# Patient Record
Sex: MALE | Race: WHITE | Employment: OTHER | URBAN - METROPOLITAN AREA
[De-identification: names, ages, dates, MRNs, and addresses within clinical notes are randomized per-mention and may not be internally consistent; named-entity substitution may affect disease eponyms.]

---

## 2017-05-25 ENCOUNTER — COMMUNICATION - HEALTHEAST (OUTPATIENT)
Dept: CARDIOLOGY | Facility: CLINIC | Age: 57
End: 2017-05-25

## 2017-10-03 ENCOUNTER — OFFICE VISIT - HEALTHEAST (OUTPATIENT)
Dept: CARDIOLOGY | Facility: CLINIC | Age: 57
End: 2017-10-03

## 2017-10-03 ENCOUNTER — COMMUNICATION - HEALTHEAST (OUTPATIENT)
Dept: TELEHEALTH | Facility: CLINIC | Age: 57
End: 2017-10-03

## 2017-10-03 DIAGNOSIS — I25.10 CAD (CORONARY ARTERY DISEASE): ICD-10-CM

## 2017-10-03 DIAGNOSIS — R42 LIGHTHEADED: ICD-10-CM

## 2017-10-03 DIAGNOSIS — E78.5 HYPERLIPIDEMIA LDL GOAL <70: ICD-10-CM

## 2017-10-03 ASSESSMENT — MIFFLIN-ST. JEOR: SCORE: 1675.35

## 2017-10-11 ENCOUNTER — HOSPITAL ENCOUNTER (OUTPATIENT)
Dept: CARDIOLOGY | Facility: CLINIC | Age: 57
Discharge: HOME | End: 2017-10-11
Attending: INTERNAL MEDICINE

## 2017-10-11 DIAGNOSIS — I25.10 CAD (CORONARY ARTERY DISEASE): ICD-10-CM

## 2017-10-11 LAB
CV STRESS CURRENT BP HE: NORMAL
CV STRESS CURRENT HR HE: 102
CV STRESS CURRENT HR HE: 109
CV STRESS CURRENT HR HE: 110
CV STRESS CURRENT HR HE: 111
CV STRESS CURRENT HR HE: 113
CV STRESS CURRENT HR HE: 113
CV STRESS CURRENT HR HE: 114
CV STRESS CURRENT HR HE: 130
CV STRESS CURRENT HR HE: 132
CV STRESS CURRENT HR HE: 132
CV STRESS CURRENT HR HE: 134
CV STRESS CURRENT HR HE: 140
CV STRESS CURRENT HR HE: 140
CV STRESS CURRENT HR HE: 145
CV STRESS CURRENT HR HE: 150
CV STRESS CURRENT HR HE: 150
CV STRESS CURRENT HR HE: 72
CV STRESS CURRENT HR HE: 78
CV STRESS CURRENT HR HE: 81
CV STRESS CURRENT HR HE: 84
CV STRESS CURRENT HR HE: 84
CV STRESS CURRENT HR HE: 85
CV STRESS CURRENT HR HE: 86
CV STRESS CURRENT HR HE: 86
CV STRESS CURRENT HR HE: 88
CV STRESS CURRENT HR HE: 88
CV STRESS CURRENT HR HE: 89
CV STRESS CURRENT HR HE: 89
CV STRESS CURRENT HR HE: 91
CV STRESS CURRENT HR HE: 96
CV STRESS CURRENT HR HE: 97
CV STRESS CURRENT HR HE: 97
CV STRESS CURRENT HR HE: 99
CV STRESS DEVIATION TIME HE: NORMAL
CV STRESS ECHO PERCENT HR HE: NORMAL
CV STRESS EXERCISE STAGE HE: NORMAL
CV STRESS FINAL RESTING BP HE: NORMAL
CV STRESS FINAL RESTING HR HE: 86
CV STRESS MAX HR HE: 150
CV STRESS MAX TREADMILL GRADE HE: 18
CV STRESS MAX TREADMILL SPEED HE: 5
CV STRESS PEAK DIA BP HE: NORMAL
CV STRESS PEAK SYS BP HE: NORMAL
CV STRESS PHASE HE: NORMAL
CV STRESS PROTOCOL HE: NORMAL
CV STRESS RESTING PT POSITION HE: NORMAL
CV STRESS RESTING PT POSITION HE: NORMAL
CV STRESS ST DEVIATION AMOUNT HE: NORMAL
CV STRESS ST DEVIATION ELEVATION HE: NORMAL
CV STRESS ST EVELATION AMOUNT HE: NORMAL
CV STRESS TEST TYPE HE: NORMAL
CV STRESS TOTAL STAGE TIME MIN 1 HE: NORMAL
ECHO EJECTION FRACTION ESTIMATED: 60 %
STRESS ECHO BASELINE BP: NORMAL
STRESS ECHO BASELINE HR: 82
STRESS ECHO CALCULATED PERCENT HR: 93 %
STRESS ECHO LAST STRESS BP: NORMAL
STRESS ECHO LAST STRESS HR: 150
STRESS ECHO POST ESTIMATED WORKLOAD: 14.7
STRESS ECHO POST EXERCISE DUR MIN: 14
STRESS ECHO POST EXERCISE DUR SEC: 0
STRESS ECHO TARGET HR: 138

## 2018-06-08 ENCOUNTER — COMMUNICATION - HEALTHEAST (OUTPATIENT)
Dept: CARDIOLOGY | Facility: CLINIC | Age: 58
End: 2018-06-08

## 2018-06-08 DIAGNOSIS — E78.5 HYPERLIPIDEMIA: ICD-10-CM

## 2018-12-01 ENCOUNTER — COMMUNICATION - HEALTHEAST (OUTPATIENT)
Dept: CARDIOLOGY | Facility: CLINIC | Age: 58
End: 2018-12-01

## 2018-12-01 DIAGNOSIS — E78.5 HYPERLIPIDEMIA: ICD-10-CM

## 2018-12-07 ENCOUNTER — COMMUNICATION - HEALTHEAST (OUTPATIENT)
Dept: CARDIOLOGY | Facility: CLINIC | Age: 58
End: 2018-12-07

## 2018-12-07 DIAGNOSIS — E78.5 HYPERLIPIDEMIA: ICD-10-CM

## 2019-01-15 ENCOUNTER — COMMUNICATION - HEALTHEAST (OUTPATIENT)
Dept: TELEHEALTH | Facility: CLINIC | Age: 59
End: 2019-01-15

## 2019-01-16 ENCOUNTER — OFFICE VISIT - HEALTHEAST (OUTPATIENT)
Dept: CARDIOLOGY | Facility: TELEHEALTH | Age: 59
End: 2019-01-16

## 2019-01-16 DIAGNOSIS — E78.5 HYPERLIPIDEMIA WITH TARGET LDL LESS THAN 70: ICD-10-CM

## 2019-01-16 DIAGNOSIS — I25.10 CAD (CORONARY ARTERY DISEASE): ICD-10-CM

## 2019-01-16 ASSESSMENT — MIFFLIN-ST. JEOR: SCORE: 1702.57

## 2019-01-24 ENCOUNTER — COMMUNICATION - HEALTHEAST (OUTPATIENT)
Dept: CARDIOLOGY | Facility: TELEHEALTH | Age: 59
End: 2019-01-24

## 2019-01-24 DIAGNOSIS — E78.5 HYPERLIPIDEMIA: ICD-10-CM

## 2019-01-31 ENCOUNTER — AMBULATORY - HEALTHEAST (OUTPATIENT)
Dept: LAB | Facility: HOSPITAL | Age: 59
End: 2019-01-31

## 2019-01-31 ENCOUNTER — COMMUNICATION - HEALTHEAST (OUTPATIENT)
Dept: CARDIOLOGY | Facility: CLINIC | Age: 59
End: 2019-01-31

## 2019-01-31 DIAGNOSIS — I25.10 CAD (CORONARY ARTERY DISEASE): ICD-10-CM

## 2019-01-31 LAB
25(OH)D3 SERPL-MCNC: 62.9 NG/ML (ref 30–80)
ALBUMIN SERPL-MCNC: 4.3 G/DL (ref 3.5–5)
ALP SERPL-CCNC: 54 U/L (ref 45–120)
ALT SERPL W P-5'-P-CCNC: 25 U/L (ref 0–45)
ANION GAP SERPL CALCULATED.3IONS-SCNC: 7 MMOL/L (ref 5–18)
AST SERPL W P-5'-P-CCNC: 23 U/L (ref 0–40)
BILIRUB SERPL-MCNC: 0.9 MG/DL (ref 0–1)
BUN SERPL-MCNC: 15 MG/DL (ref 8–22)
CALCIUM SERPL-MCNC: 9.6 MG/DL (ref 8.5–10.5)
CHLORIDE BLD-SCNC: 103 MMOL/L (ref 98–107)
CHOLEST SERPL-MCNC: 124 MG/DL
CO2 SERPL-SCNC: 29 MMOL/L (ref 22–31)
CREAT SERPL-MCNC: 1.13 MG/DL (ref 0.7–1.3)
FASTING STATUS PATIENT QL REPORTED: YES
GFR SERPL CREATININE-BSD FRML MDRD: >60 ML/MIN/1.73M2
GLUCOSE BLD-MCNC: 100 MG/DL (ref 70–125)
HDLC SERPL-MCNC: 49 MG/DL
LDLC SERPL CALC-MCNC: 65 MG/DL
MAGNESIUM SERPL-MCNC: 2.3 MG/DL (ref 1.8–2.6)
POTASSIUM BLD-SCNC: 4.1 MMOL/L (ref 3.5–5)
PROT SERPL-MCNC: 7.3 G/DL (ref 6–8)
SODIUM SERPL-SCNC: 139 MMOL/L (ref 136–145)
TRIGL SERPL-MCNC: 50 MG/DL

## 2019-02-05 ENCOUNTER — COMMUNICATION - HEALTHEAST (OUTPATIENT)
Dept: CARDIOLOGY | Facility: CLINIC | Age: 59
End: 2019-02-05

## 2019-02-05 DIAGNOSIS — E78.5 HYPERLIPIDEMIA: ICD-10-CM

## 2019-02-28 ENCOUNTER — COMMUNICATION - HEALTHEAST (OUTPATIENT)
Dept: CARDIOLOGY | Facility: TELEHEALTH | Age: 59
End: 2019-02-28

## 2019-02-28 DIAGNOSIS — E78.5 HYPERLIPIDEMIA: ICD-10-CM

## 2019-08-30 ENCOUNTER — COMMUNICATION - HEALTHEAST (OUTPATIENT)
Dept: CARDIOLOGY | Facility: CLINIC | Age: 59
End: 2019-08-30

## 2019-09-05 ENCOUNTER — COMMUNICATION - HEALTHEAST (OUTPATIENT)
Dept: CARDIOLOGY | Facility: CLINIC | Age: 59
End: 2019-09-05

## 2019-09-06 ENCOUNTER — COMMUNICATION - HEALTHEAST (OUTPATIENT)
Dept: CARDIOLOGY | Facility: CLINIC | Age: 59
End: 2019-09-06

## 2019-09-11 ENCOUNTER — COMMUNICATION - HEALTHEAST (OUTPATIENT)
Dept: CARDIOLOGY | Facility: CLINIC | Age: 59
End: 2019-09-11

## 2019-09-11 DIAGNOSIS — I25.10 CORONARY ATHEROSCLEROSIS: ICD-10-CM

## 2019-09-26 ENCOUNTER — HOSPITAL ENCOUNTER (OUTPATIENT)
Dept: CT IMAGING | Facility: CLINIC | Age: 59
Discharge: HOME | End: 2019-09-26
Attending: INTERNAL MEDICINE

## 2019-09-26 DIAGNOSIS — I25.10 CORONARY ATHEROSCLEROSIS: ICD-10-CM

## 2019-09-26 LAB
BSA FOR ECHO PROCEDURE: 2.01 M2
CREAT BLD-MCNC: 0.9 MG/DL (ref 0.7–1.3)
GFR SERPL CREATININE-BSD FRML MDRD: >60 ML/MIN/1.73M2
LEFT VENTRICLE HEART RATE: 58 BPM

## 2019-09-26 ASSESSMENT — MIFFLIN-ST. JEOR: SCORE: 1636.79

## 2019-10-10 ENCOUNTER — COMMUNICATION - HEALTHEAST (OUTPATIENT)
Dept: CARDIOLOGY | Facility: CLINIC | Age: 59
End: 2019-10-10

## 2019-10-11 ENCOUNTER — OFFICE VISIT - HEALTHEAST (OUTPATIENT)
Dept: CARDIOLOGY | Facility: CLINIC | Age: 59
End: 2019-10-11

## 2019-10-11 DIAGNOSIS — E78.5 HYPERLIPIDEMIA WITH TARGET LDL LESS THAN 70: ICD-10-CM

## 2019-10-11 DIAGNOSIS — I25.10 ATHEROSCLEROSIS OF NATIVE CORONARY ARTERY OF NATIVE HEART WITHOUT ANGINA PECTORIS: ICD-10-CM

## 2019-10-11 ASSESSMENT — MIFFLIN-ST. JEOR: SCORE: 1664.01

## 2019-10-28 ENCOUNTER — COMMUNICATION - HEALTHEAST (OUTPATIENT)
Dept: CARDIOLOGY | Facility: CLINIC | Age: 59
End: 2019-10-28

## 2019-11-01 ENCOUNTER — COMMUNICATION - HEALTHEAST (OUTPATIENT)
Dept: CARDIOLOGY | Facility: CLINIC | Age: 59
End: 2019-11-01

## 2019-11-05 ENCOUNTER — COMMUNICATION - HEALTHEAST (OUTPATIENT)
Dept: CARDIOLOGY | Facility: CLINIC | Age: 59
End: 2019-11-05

## 2019-11-23 ENCOUNTER — COMMUNICATION - HEALTHEAST (OUTPATIENT)
Dept: CARDIOLOGY | Facility: CLINIC | Age: 59
End: 2019-11-23

## 2019-11-23 DIAGNOSIS — E78.5 HYPERLIPIDEMIA: ICD-10-CM

## 2020-02-20 ENCOUNTER — OFFICE VISIT - HEALTHEAST (OUTPATIENT)
Dept: INTERNAL MEDICINE | Facility: CLINIC | Age: 60
End: 2020-02-20

## 2020-02-20 DIAGNOSIS — I25.10 ATHEROSCLEROSIS OF NATIVE CORONARY ARTERY OF NATIVE HEART WITHOUT ANGINA PECTORIS: ICD-10-CM

## 2020-02-20 DIAGNOSIS — Z12.11 SCREEN FOR COLON CANCER: ICD-10-CM

## 2020-02-20 DIAGNOSIS — Z12.5 SCREENING FOR PROSTATE CANCER: ICD-10-CM

## 2020-02-20 DIAGNOSIS — R10.32 LLQ ABDOMINAL PAIN: ICD-10-CM

## 2020-02-20 DIAGNOSIS — Z00.00 ROUTINE GENERAL MEDICAL EXAMINATION AT A HEALTH CARE FACILITY: ICD-10-CM

## 2020-02-20 DIAGNOSIS — Z23 IMMUNIZATION DUE: ICD-10-CM

## 2020-02-20 DIAGNOSIS — Z13.220 SCREENING FOR HYPERLIPIDEMIA: ICD-10-CM

## 2020-02-20 DIAGNOSIS — E78.5 HYPERLIPIDEMIA WITH TARGET LDL LESS THAN 70: ICD-10-CM

## 2020-02-20 LAB
ALBUMIN SERPL-MCNC: 4.3 G/DL (ref 3.5–5)
ALBUMIN UR-MCNC: NEGATIVE MG/DL
ALP SERPL-CCNC: 54 U/L (ref 45–120)
ALT SERPL W P-5'-P-CCNC: 21 U/L (ref 0–45)
ANION GAP SERPL CALCULATED.3IONS-SCNC: 7 MMOL/L (ref 5–18)
APPEARANCE UR: CLEAR
AST SERPL W P-5'-P-CCNC: 22 U/L (ref 0–40)
BILIRUB SERPL-MCNC: 0.6 MG/DL (ref 0–1)
BILIRUB UR QL STRIP: NEGATIVE
BUN SERPL-MCNC: 12 MG/DL (ref 8–22)
CALCIUM SERPL-MCNC: 9.7 MG/DL (ref 8.5–10.5)
CHLORIDE BLD-SCNC: 105 MMOL/L (ref 98–107)
CHOLEST SERPL-MCNC: 112 MG/DL
CO2 SERPL-SCNC: 29 MMOL/L (ref 22–31)
COLOR UR AUTO: YELLOW
CREAT SERPL-MCNC: 1 MG/DL (ref 0.7–1.3)
ERYTHROCYTE [DISTWIDTH] IN BLOOD BY AUTOMATED COUNT: 12 % (ref 11–14.5)
FASTING STATUS PATIENT QL REPORTED: NORMAL
GFR SERPL CREATININE-BSD FRML MDRD: >60 ML/MIN/1.73M2
GLUCOSE BLD-MCNC: 91 MG/DL (ref 70–125)
GLUCOSE UR STRIP-MCNC: NEGATIVE MG/DL
HCT VFR BLD AUTO: 41.9 % (ref 40–54)
HDLC SERPL-MCNC: 46 MG/DL
HGB BLD-MCNC: 14.3 G/DL (ref 14–18)
HGB UR QL STRIP: NEGATIVE
KETONES UR STRIP-MCNC: NEGATIVE MG/DL
LDLC SERPL CALC-MCNC: 54 MG/DL
LEUKOCYTE ESTERASE UR QL STRIP: NEGATIVE
MCH RBC QN AUTO: 30.1 PG (ref 27–34)
MCHC RBC AUTO-ENTMCNC: 34.1 G/DL (ref 32–36)
MCV RBC AUTO: 89 FL (ref 80–100)
NITRATE UR QL: NEGATIVE
PH UR STRIP: 5 [PH] (ref 5–8)
PLATELET # BLD AUTO: 185 THOU/UL (ref 140–440)
PMV BLD AUTO: 9.1 FL (ref 7–10)
POTASSIUM BLD-SCNC: 4.5 MMOL/L (ref 3.5–5)
PROT SERPL-MCNC: 6.8 G/DL (ref 6–8)
PSA SERPL-MCNC: 0.9 NG/ML (ref 0–4.5)
RBC # BLD AUTO: 4.73 MILL/UL (ref 4.4–6.2)
SODIUM SERPL-SCNC: 141 MMOL/L (ref 136–145)
SP GR UR STRIP: 1.02 (ref 1–1.03)
TRIGL SERPL-MCNC: 58 MG/DL
TSH SERPL DL<=0.005 MIU/L-ACNC: 1.18 UIU/ML (ref 0.3–5)
UROBILINOGEN UR STRIP-ACNC: NORMAL
WBC: 6 THOU/UL (ref 4–11)

## 2020-02-20 ASSESSMENT — MIFFLIN-ST. JEOR: SCORE: 1668.55

## 2020-02-25 ENCOUNTER — HOSPITAL ENCOUNTER (OUTPATIENT)
Dept: CT IMAGING | Facility: CLINIC | Age: 60
Discharge: HOME | End: 2020-02-25
Attending: INTERNAL MEDICINE

## 2020-02-25 DIAGNOSIS — R10.32 LLQ ABDOMINAL PAIN: ICD-10-CM

## 2020-03-03 ENCOUNTER — RECORDS - HEALTHEAST (OUTPATIENT)
Dept: ADMINISTRATIVE | Facility: OTHER | Age: 60
End: 2020-03-03

## 2020-06-04 ENCOUNTER — COMMUNICATION - HEALTHEAST (OUTPATIENT)
Dept: CARDIOLOGY | Facility: CLINIC | Age: 60
End: 2020-06-04

## 2020-06-04 DIAGNOSIS — E78.5 HYPERLIPIDEMIA: ICD-10-CM

## 2020-09-18 ENCOUNTER — COMMUNICATION - HEALTHEAST (OUTPATIENT)
Dept: ADMINISTRATIVE | Facility: CLINIC | Age: 60
End: 2020-09-18

## 2020-12-19 ENCOUNTER — COMMUNICATION - HEALTHEAST (OUTPATIENT)
Dept: CARDIOLOGY | Facility: CLINIC | Age: 60
End: 2020-12-19

## 2020-12-19 DIAGNOSIS — E78.5 HYPERLIPIDEMIA: ICD-10-CM

## 2021-02-08 ENCOUNTER — AMBULATORY - HEALTHEAST (OUTPATIENT)
Dept: CARDIOLOGY | Facility: CLINIC | Age: 61
End: 2021-02-08

## 2021-02-08 ENCOUNTER — COMMUNICATION - HEALTHEAST (OUTPATIENT)
Dept: CARDIOLOGY | Facility: CLINIC | Age: 61
End: 2021-02-08

## 2021-02-08 DIAGNOSIS — Z00.6 EXAMINATION OF PARTICIPANT OR CONTROL IN CLINICAL RESEARCH: ICD-10-CM

## 2021-02-09 ENCOUNTER — AMBULATORY - HEALTHEAST (OUTPATIENT)
Dept: CARDIOLOGY | Facility: CLINIC | Age: 61
End: 2021-02-09

## 2021-02-09 DIAGNOSIS — Z00.6 EXAMINATION OF PARTICIPANT OR CONTROL IN CLINICAL RESEARCH: ICD-10-CM

## 2021-02-09 ASSESSMENT — MIFFLIN-ST. JEOR: SCORE: 1641.33

## 2021-02-24 ENCOUNTER — OFFICE VISIT - HEALTHEAST (OUTPATIENT)
Dept: INTERNAL MEDICINE | Facility: CLINIC | Age: 61
End: 2021-02-24

## 2021-02-24 DIAGNOSIS — E78.5 HYPERLIPIDEMIA WITH TARGET LDL LESS THAN 70: ICD-10-CM

## 2021-02-24 DIAGNOSIS — Z13.1 SCREENING FOR DIABETES MELLITUS: ICD-10-CM

## 2021-02-24 DIAGNOSIS — I25.10 ATHEROSCLEROSIS OF NATIVE CORONARY ARTERY OF NATIVE HEART WITHOUT ANGINA PECTORIS: ICD-10-CM

## 2021-02-24 DIAGNOSIS — Z12.5 SCREENING FOR PROSTATE CANCER: ICD-10-CM

## 2021-02-24 DIAGNOSIS — R19.7 DIARRHEA, UNSPECIFIED TYPE: ICD-10-CM

## 2021-02-24 DIAGNOSIS — K90.41 GLUTEN INTOLERANCE: ICD-10-CM

## 2021-02-24 DIAGNOSIS — Z00.00 ROUTINE GENERAL MEDICAL EXAMINATION AT A HEALTH CARE FACILITY: ICD-10-CM

## 2021-02-24 LAB
ALBUMIN SERPL-MCNC: 4.3 G/DL (ref 3.5–5)
ALP SERPL-CCNC: 56 U/L (ref 45–120)
ALT SERPL W P-5'-P-CCNC: 21 U/L (ref 0–45)
ANION GAP SERPL CALCULATED.3IONS-SCNC: 10 MMOL/L (ref 5–18)
AST SERPL W P-5'-P-CCNC: 23 U/L (ref 0–40)
BILIRUB SERPL-MCNC: 0.4 MG/DL (ref 0–1)
BUN SERPL-MCNC: 16 MG/DL (ref 8–22)
CALCIUM SERPL-MCNC: 9.5 MG/DL (ref 8.5–10.5)
CHLORIDE BLD-SCNC: 105 MMOL/L (ref 98–107)
CHOLEST SERPL-MCNC: 116 MG/DL
CO2 SERPL-SCNC: 26 MMOL/L (ref 22–31)
CREAT SERPL-MCNC: 1 MG/DL (ref 0.7–1.3)
ERYTHROCYTE [DISTWIDTH] IN BLOOD BY AUTOMATED COUNT: 13 % (ref 11–14.5)
FASTING STATUS PATIENT QL REPORTED: NO
GFR SERPL CREATININE-BSD FRML MDRD: >60 ML/MIN/1.73M2
GLUCOSE BLD-MCNC: 88 MG/DL (ref 70–125)
HBA1C MFR BLD: 5.9 %
HCT VFR BLD AUTO: 43.5 % (ref 40–54)
HDLC SERPL-MCNC: 53 MG/DL
HGB BLD-MCNC: 14.5 G/DL (ref 14–18)
LDLC SERPL CALC-MCNC: 49 MG/DL
MCH RBC QN AUTO: 29.9 PG (ref 27–34)
MCHC RBC AUTO-ENTMCNC: 33.3 G/DL (ref 32–36)
MCV RBC AUTO: 90 FL (ref 80–100)
PLATELET # BLD AUTO: 191 THOU/UL (ref 140–440)
PMV BLD AUTO: 11 FL (ref 7–10)
POTASSIUM BLD-SCNC: 4.9 MMOL/L (ref 3.5–5)
PROT SERPL-MCNC: 6.9 G/DL (ref 6–8)
PSA SERPL-MCNC: 1.1 NG/ML (ref 0–4.5)
RBC # BLD AUTO: 4.85 MILL/UL (ref 4.4–6.2)
SODIUM SERPL-SCNC: 141 MMOL/L (ref 136–145)
TRIGL SERPL-MCNC: 70 MG/DL
WBC: 5 THOU/UL (ref 4–11)

## 2021-02-24 ASSESSMENT — MIFFLIN-ST. JEOR: SCORE: 1631.69

## 2021-02-25 LAB
TTG IGA SER-ACNC: 0.4 U/ML
TTG IGG SER-ACNC: <0.6 U/ML

## 2021-03-02 ENCOUNTER — AMBULATORY - HEALTHEAST (OUTPATIENT)
Dept: CARDIOLOGY | Facility: CLINIC | Age: 61
End: 2021-03-02

## 2021-03-02 DIAGNOSIS — Z00.6 EXAMINATION OF PARTICIPANT IN CLINICAL TRIAL: ICD-10-CM

## 2021-03-11 ENCOUNTER — COMMUNICATION - HEALTHEAST (OUTPATIENT)
Dept: INTERNAL MEDICINE | Facility: CLINIC | Age: 61
End: 2021-03-11

## 2021-03-11 DIAGNOSIS — E78.5 HYPERLIPIDEMIA: ICD-10-CM

## 2021-03-11 RX ORDER — ATORVASTATIN CALCIUM 40 MG/1
TABLET, FILM COATED ORAL
Qty: 90 TABLET | Refills: 3 | Status: SHIPPED | OUTPATIENT
Start: 2021-03-11 | End: 2022-03-09

## 2021-03-16 ENCOUNTER — AMBULATORY - HEALTHEAST (OUTPATIENT)
Dept: CARDIOLOGY | Facility: CLINIC | Age: 61
End: 2021-03-16

## 2021-03-16 DIAGNOSIS — Z00.6 EXAMINATION OF PARTICIPANT IN CLINICAL TRIAL: ICD-10-CM

## 2021-05-27 VITALS
TEMPERATURE: 97.8 F | SYSTOLIC BLOOD PRESSURE: 113 MMHG | RESPIRATION RATE: 16 BRPM | OXYGEN SATURATION: 97 % | HEART RATE: 72 BPM | DIASTOLIC BLOOD PRESSURE: 81 MMHG

## 2021-05-27 VITALS
TEMPERATURE: 98.2 F | SYSTOLIC BLOOD PRESSURE: 123 MMHG | OXYGEN SATURATION: 99 % | HEART RATE: 58 BPM | RESPIRATION RATE: 16 BRPM | DIASTOLIC BLOOD PRESSURE: 74 MMHG

## 2021-05-31 VITALS — HEIGHT: 73 IN | BODY MASS INDEX: 23.86 KG/M2 | WEIGHT: 180 LBS

## 2021-06-01 NOTE — TELEPHONE ENCOUNTER
I sent him a return email suggesting that he consider doing either a stress test or CT angiogram.  He exercises vigorously and is 5 years out from his bypass surgery.  All await his response to my email.  Thank you chiquita

## 2021-06-01 NOTE — TELEPHONE ENCOUNTER
----- Message from Spencer Benjamin sent at 9/5/2019 11:53 AM CDT -----  Contact: Pt  General phone call:    Caller: Pt    Primary cardiologist: Dr Wood    Detailed reason for call: Pt sent a message to Dr Wood in the past week - Dr Wood did respond but Pt states he doesn't have access to MyChart anymore for some reason - He wanted a call back to discuss his concern about chest pain    New or active symptoms? new  Best phone number: cell [phone]  Best time to contact: any  Ok to leave a detailed message? yes  Device? no    Additional Info:

## 2021-06-01 NOTE — TELEPHONE ENCOUNTER
Message copied from [employer]:  Sent by Jacoby Wood MD   to Viral Martinez CHRIST  September 1, 2019 @ 9:59 AM   Hard to be certain as to the cause given that it is non exertional, we have discussed pursuing the CT angiogram.How do you feel about that plan? If getting more significant please consider more urgent evaluation.Let me know your thoughts and we can plan to proceed. CRISTIAN Wood     Noted per Dr. Wood's 1-16-19 visit note, f/u recommended in 6mo - no f/u sched or pending.

## 2021-06-01 NOTE — TELEPHONE ENCOUNTER
----- Message from Thania Higuera sent at 9/11/2019  1:27 PM CDT -----  Detailed reason for call: Pt states [facility] wants him to have a CTA scan- no orders for this     I'm so sorry Kylie- a name would help!     Thania    ----- Message -----  From: Thania Higuera  Sent: 9/11/2019  12:57 PM  To: Kylie Orourke RN    General phone call:    Caller: Pt  Primary cardiologist: Nina  Detailed reason for call: Pt states [facility] wants him to have a CTA scan- no orders for this  New or active symptoms?   Best phone number: [phone]  Best time to contact:   Ok to leave a detailed message?   Device? No    Additional Info:

## 2021-06-01 NOTE — TELEPHONE ENCOUNTER
Return call to patient - informed him of Dr. Wood's reply on MyChart and that he is due for f/u - patient stated he has not had any more sx of CP since episode described in 8-30-19 email to Dr. Wood and would prefer to wait to discuss further testing at this time.    Patient explained he had a very stressful day at work on 8-27-19 which may have caused episode of CP that lasted approx 15min - the following day he had mild discomfort on and off but stressed again that he has not had anymore sx since - reviewed prn protocol for Nitrostat SL and when to be evaluated in ED - patient agreed to call back to arrange f/u if wait time on hold too long - reassured patient that update would be forwarded to Dr. Wood - patient verbalized understanding - call transf to sched.    Please review patient update regarding MyChart email sent 8-30-19 as HAYES quintero   at home:

## 2021-06-02 VITALS — WEIGHT: 186 LBS | HEIGHT: 73 IN | BODY MASS INDEX: 24.65 KG/M2

## 2021-06-02 NOTE — PATIENT INSTRUCTIONS - HE
Please call my nurse Kylie with any heart related questions.Her number is [phone]  We talked about the CT angiogram and the one vein graft that was not visualized was the graft to the diagonal branch.Plan further group review next week.

## 2021-06-03 VITALS
HEIGHT: 72 IN | DIASTOLIC BLOOD PRESSURE: 70 MMHG | RESPIRATION RATE: 16 BRPM | WEIGHT: 181 LBS | BODY MASS INDEX: 24.52 KG/M2 | HEART RATE: 76 BPM | SYSTOLIC BLOOD PRESSURE: 126 MMHG

## 2021-06-03 VITALS — WEIGHT: 175 LBS | HEIGHT: 72 IN | BODY MASS INDEX: 23.7 KG/M2

## 2021-06-03 NOTE — TELEPHONE ENCOUNTER
Recommendations discussed with patient.  Pt verbalized understanding and reports he has not had anymore symptoms since August - leading him to believe it was stress from work that caused the episode.  Pt declines stress testing at this point and will call back if he starts having symptoms or notices changes in the way he feels.    Dr Wood updated.  -[facility]

## 2021-06-04 VITALS
BODY MASS INDEX: 24.65 KG/M2 | SYSTOLIC BLOOD PRESSURE: 126 MMHG | HEIGHT: 72 IN | HEART RATE: 67 BPM | WEIGHT: 182 LBS | OXYGEN SATURATION: 98 % | DIASTOLIC BLOOD PRESSURE: 70 MMHG

## 2021-06-05 VITALS
RESPIRATION RATE: 18 BRPM | WEIGHT: 176 LBS | SYSTOLIC BLOOD PRESSURE: 116 MMHG | BODY MASS INDEX: 23.84 KG/M2 | TEMPERATURE: 97.7 F | OXYGEN SATURATION: 97 % | HEART RATE: 53 BPM | DIASTOLIC BLOOD PRESSURE: 76 MMHG | HEIGHT: 72 IN

## 2021-06-05 VITALS
SYSTOLIC BLOOD PRESSURE: 122 MMHG | DIASTOLIC BLOOD PRESSURE: 60 MMHG | BODY MASS INDEX: 23.43 KG/M2 | HEIGHT: 72 IN | WEIGHT: 173 LBS | HEART RATE: 60 BPM | OXYGEN SATURATION: 99 %

## 2021-06-06 NOTE — PROGRESS NOTES
Office Visit - Physical   Viral Martinez   59 y.o.  male    Date of visit: 2/20/2020  Physician: Matt Walker MD     Assessment and Plan   1. Routine general medical examination at a health care facility  This is a generally healthy 59-year-old man with issues as discussed below.  Ongoing healthy lifestyle discussed and recommended.  Immunizations reviewed.  He is interested in tetanus today but not shingles    2. Screen for colon cancer  - Ambulatory referral for Colonoscopy    3. Screening for hyperlipidemia  - Lipid Cascade    4. Screening for prostate cancer  - PSA (Prostatic-Specific Antigen), Annual Screen    5. Immunization due  - Tdap vaccine,  8yo or older,  IM    6. LLQ abdominal pain  Discussed with him that his pain with change in bowel movement and generally not feeling 100%, concern for diverticulitis.  I do not want to proceed with a colonoscopy if he has active diverticulitis and therefore we will proceed with a CT scan and lab evaluation as below  - CT Abdomen Pelvis Without Oral Without IV Contrast; Future  - HM2(CBC w/o Differential)  - Comprehensive Metabolic Panel  - Urinalysis-UC if Indicated    7. Atherosclerosis of native coronary artery of native heart without angina pectoris  Continue with secondary prevention in the guidance of Dr. Jacoby Wood    8. Hyperlipidemia with target LDL less than 70  - Thyroid [place]    Return in about 2 weeks (around 3/5/2020) for office visit, if symptoms worsen/fail to improve.     Chief Complaint   Chief Complaint   Patient presents with     Annual Exam     fasting        Patient Profile   Social History     Social History Narrative    Lives with his partner, Ruba Cox (cardiac nurse).  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  Son Lambert and daughter Keli.        Past Medical History   Patient Active Problem List   Diagnosis     Diastolic Dysfunction     Coronary Artery Disease     Hyperlipidemia with target LDL less than 70       Past Surgical History  He has a past  surgical history that includes pr cabg, vein, single; Cardiac catheterization; and Coronary artery bypass graft (2014).     History of Present Illness   This 59 y.o. old man comes in to [facility] care, for annual physical and evaluation of some left lower quadrant abdominal pain and change in bowel movements.  He has a history of coronary heart disease with three-vessel bypass I believe around 2014.  He is done well since that time and had recent stress echocardiogram which looked okay.  He has been followed by Dr. Jacoby Wood.  Recently he has been having a few months of left lower quadrant abdominal pain with change in bowel movement some diarrhea lots of gas and some general malaise.  He thought maybe it has something to do with his diet and is tried changing some foods without much benefit.  No specific fever chills.  No history of colonoscopy.  No urinary symptoms.  No symptoms of enlarged prostate.    Review of Systems: A comprehensive review of systems was negative except as noted.     Medications and Allergies   Current Outpatient Medications   Medication Sig Dispense Refill     ASCORBATE CALCIUM (VITAMIN C ORAL) Take 1 tablet by mouth daily as needed.              aspirin 325 MG tablet Take 162 mg by mouth daily. Per patient 8/14/15       atorvastatin (LIPITOR) 40 MG tablet TAKE 1 TABLET BY MOUTH AT BEDTIME 90 tablet 1     cholecalciferol, vitamin D3, 5,000 unit Tab Take 1 tablet by mouth. TAKE 1 TABLET 5 X WEEK       coenzyme Q10 (CO Q-10) 200 mg capsule Take 200 mg by mouth daily.       nitroglycerin (NITROSTAT) 0.4 MG SL tablet Place 1 tablet (0.4 mg total) under the tongue every 5 (five) minutes as needed for chest pain. 25 tablet 5     omega-3 fatty acids-vitamin E (FISH OIL) 1,000 mg cap Take 1 capsule by mouth 2 (two) times a day.       No current facility-administered medications for this visit.      No Known Allergies     Family and Social History   Family History   Problem Relation Age of Onset      Heart attack Brother      No Medical Problems Mother         89     No Medical Problems Father         97     Cervical cancer Sister      Heart attack Brother      Heart attack Brother      Prostate cancer Brother      No Medical Problems Brother      No Medical Problems Sister      No Medical Problems Sister      No Medical Problems Sister      No Medical Problems Sister      No Medical Problems Sister      No Medical Problems Sister      No Medical Problems Daughter      No Medical Problems Son         Social History     Tobacco Use     Smoking status: Never Smoker     Smokeless tobacco: Never Used   Substance Use Topics     Alcohol use: Never     Frequency: Never     Drug use: Never        Physical Exam   General Appearance:   No acute distress    /70 (Patient Site: Left Arm, Patient Position: Sitting, Cuff Size: Adult Regular)   Pulse 67   Ht 6' (1.829 m)   Wt 182 lb (82.6 kg)   SpO2 98%   BMI 24.68 kg/m      EYES: Eyelids, conjunctiva, and sclera were normal. Pupils were normal. Cornea, iris, and lens were normal bilaterally.  HEAD, EARS, NOSE, MOUTH, AND THROAT: Head and face were normal. Hearing was normal to voice and the ears were normal to external exam. Nose appearance was normal and there was no discharge. Oropharynx was normal.  NECK: Neck appearance was normal. There were no neck masses and the thyroid was not enlarged.  RESPIRATORY: Breathing pattern was normal and the chest moved symmetrically.  Percussion/auscultatory percussion was normal.  Lung sounds were normal and there were no abnormal sounds.  CARDIOVASCULAR: Heart rate and rhythm were normal.  S1 and S2 were normal and there were no extra sounds or murmurs. Peripheral pulses in arms and legs were normal.  Jugular venous pressure was normal.  There was no peripheral edema.  GASTROINTESTINAL: The abdomen was normal in contour.  Bowel sounds were present.  Percussion detected no organ enlargement or tenderness.  Palpation  detected mild left lower quadrant tenderness without mass, or enlarged organs.   MUSCULOSKELETAL: Skeletal configuration was normal and muscle mass was normal for age. Joint appearance was overall normal.  LYMPHATIC: There were no enlarged nodes.  SKIN/HAIR/NAILS: Skin color was normal.  There were no skin lesions.  Hair and nails were normal.  NEUROLOGIC: The patient was alert and oriented to person, place, time, and circumstance. Speech was normal. Cranial nerves were normal. Motor strength was normal for age. The patient was normally coordinated.  PSYCHIATRIC:  Mood and affect were normal and the patient had normal recent and remote memory. The patient's judgment and insight were normal.       Additional Information        Matt Walker MD  Internal Medicine  Contact me at [phone]

## 2021-06-13 NOTE — PROGRESS NOTES
[facility] Progress Note    Assessment:  1.  Coronary artery disease with bypass surgery as outlined below in 2014.  He continues to be a vigorous exerciser.  He does describe some infrequent episodes of chest discomfort and we talked about the importance of warming up and cooling down.  We are going to plan an exercise stress echocardiogram secondary to the occasional chest discomfort and is very vigorous exercise pursuits.  I will comment once this is available for review    2.  Hyperlipidemia with most recent lipid studies October 2016.  Liver function tests were normal at that time with an LDL cholesterol of 66.    3.  Episodic lightheadedness.  This occurs primarily with standing in position.  We talked about trial of support stockings and keeping himself hydrated.  He will update me with how these maneuvers have helped and then make additional decisions pending his response.        Plan: As outlined above with follow-up in 6 months.    1. CAD (coronary artery disease)  Echo Stress Exercise   2. Hyperlipidemia LDL goal <70     3. Lightheaded           An After Visit Summary was printed and given to the patient.    Subjective:    Viral Martinez is a 57 y.o. male who returned for a planned  follow up visit.  He reports that overall he has been feeling very well.  He continues to be a very active exerciser without specific complaints.  He does tell me on occasion he will develop a chest discomfort especially if he does not warm up or cool down.  This symptom has not been progressive.  He also reports some lightheadedness a few times per day after he has been standing for length of time.  This lasts a short duration and not associated with any heart rhythm abnormality.  He does try to keep himself well-hydrated but but states that other times he could keep himself even more hydrated.  He has a history of coronary artery disease as outlined with bypass surgery in 2014 with left internal mammary  artery to the left anterior descending, saphenous vein graft to the diagonal/obtuse marginal and right posterior descending coronary arteries.    Review of Systems:   General: WNL  Eyes: WNL  Ears/Nose/Throat: WNL  Lungs: WNL  Heart: Chest Pain  Stomach: WNL  Bladder: WNL  Muscle/Joints: WNL  Skin: WNL  Nervous System: Dizziness, Loss of Balance  Mental Health: WNL     Blood: WNL      Problem List:    Patient Active Problem List   Diagnosis     Diastolic Dysfunction     Esophageal Reflux     Hyperlipidemia LDL goal <70     Coronary Artery Disease     Lightheaded       Social History     Social History     Marital status: Single     Spouse name: N/A     Number of children: N/A     Years of education: N/A     Occupational History     Not on file.     Social History Main Topics     Smoking status: Never Smoker     Smokeless tobacco: Not on file     Alcohol use Not on file     Drug use: Not on file     Sexual activity: Not on file     Other Topics Concern     Not on file     Social History Narrative       No family history on file.    Current Outpatient Prescriptions   Medication Sig Dispense Refill     ASCORBATE CALCIUM (VITAMIN C ORAL) Take by mouth.       aspirin 325 MG tablet Take 162 mg by mouth daily. Per patient 8/14/15       atorvastatin (LIPITOR) 40 MG tablet TAKE 1 TABLET BY MOUTH NIGHTLY AT BEDTIME 90 tablet 2     cholecalciferol, vitamin D3, 5,000 unit Tab Take 1 tablet by mouth. TAKE 1 TABLET 5 X WEEK       coenzyme Q10 (CO Q-10) 200 mg capsule Take 200 mg by mouth daily.       MAGNESIUM ORAL Take by mouth. TAKING 5 DAYS PER WEEK.       omega-3 fatty acids-vitamin E (FISH OIL) 1,000 mg cap Take 1 capsule by mouth 2 (two) times a day.       multivitamin therapeutic (THERAGRAN) tablet Take 1 tablet by mouth daily.       nitroglycerin (NITROSTAT) 0.4 MG SL tablet Place 1 tablet (0.4 mg total) under the tongue every 5 (five) minutes as needed for chest pain. 25 tablet 5     No current facility-administered  "medications for this visit.        Objective:     /68 (Patient Site: Left Arm, Patient Position: Sitting, Cuff Size: Adult Regular)  Pulse 68  Resp 18  Ht 6' 1\" (1.854 m)  Wt 180 lb (81.6 kg)  SpO2 97%  BMI 23.75 kg/m2  180 lb (81.6 kg)       Physical Exam:    GENERAL APPEARANCE: alert, no apparent distress  HEENT: no scleral icterus or xanthelasma  NECK: jugular venous pressure within normal limits  CHEST: symmetric, the lungs are clear to auscultation, chest wall scar well-healed  CARDIOVASCULAR: regular rhythm without murmur, click, or gallop; no carotid bruits  Abdomen: No Organomegaly, masses, bruits, or tenderness. Bowels sounds are present      EXTREMITIES: no cyanosis, clubbing or edema    Cardiac Testing:  Procedure Date  Date: 07/01/2014 Start: 10:55 AM     Study Location: [facility]  Technical Quality: Adequate visualization     Patient Status: Routine     Height: 72 inches Weight: 168 pounds BSA: 1.98 m^2 BMI: 22.79 kg/m^2     HR: 64 bpm     Indications  Shortness of breath and s/P CABG.      Conclusions       Summary   Normal left ventricular size and systolic function.   Left ventricular ejection fraction is visually estimated to be 55%.   No significant valvular abnormalities.      Lab Results:    Lab Results   Component Value Date     07/01/2014    K 4.0 07/01/2014     07/01/2014    CO2 28 07/01/2014    BUN 16 07/01/2014    CREATININE 0.96 07/01/2014    CALCIUM 9.6 07/01/2014     Lab Results   Component Value Date    CHOL 125 10/31/2016    TRIG 39 10/31/2016    HDL 51 10/31/2016    LDLDIRECT 48 12/08/2015     BNP (pg/mL)   Date Value   07/01/2014 74 (H)     Creatinine (mg/dL)   Date Value   07/01/2014 0.96   04/13/2014 0.95   04/11/2014 0.86   04/10/2014 0.78     LDL Calculated (mg/dL)   Date Value   10/31/2016 66   11/19/2014 68   07/17/2014 42     Lab Results   Component Value Date    WBC 4.4 07/01/2014    HGB 14.5 07/01/2014    HCT 41.4 07/01/2014    MCV 86 07/01/2014    PLT " 172 07/01/2014               This note has been dictated using voice recognition software. Any grammatical or context distortions are unintentional and inherent to the software.      Jacoby Wood M.D., F.A.C.C.  [facility]  [phone]

## 2021-06-15 NOTE — PROGRESS NOTES
Office Visit - Physical   Viral Martinez   60 y.o.  male    Date of visit: 2/24/2021  Physician: Matt Walker MD     Assessment and Plan   1. Routine general medical examination at a health care facility  This is a 60-year-old man with issues as discussed below.  We deferred vaccinations because he is part of the COVID-19 Novacks trial.    2. Diarrhea, unspecified type  CT and colonoscopy generally looked okay.  Some sensitivity to gluten, check for celiac disease  - Tissue Transglutaminase,IgA & IgG    3. Gluten intolerance  - Tissue Transglutaminase,IgA & IgG    4. Hyperlipidemia with target LDL less than 70  Continue statin    5. Atherosclerosis of native coronary artery of native heart without angina pectoris  Continue secondary prevention  - HM2(CBC w/o Differential)  - Lipid Cascade  - Comprehensive Metabolic Panel    6. Screening for prostate cancer  - PSA (Prostatic-Specific Antigen), Annual Screen    7. Screening for diabetes mellitus  - Glycosylated Hemoglobin A1c      Return in about 1 year (around 2/24/2022) for recheck, annual physical.     Chief Complaint   Chief Complaint   Patient presents with     Annual Exam     pt fasting        Patient Profile   Social History     Social History Narrative    Lives with his partner, Ruba Cox (cardiac nurse).  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  Son Lambert and daughter Keli.        Past Medical History   Patient Active Problem List   Diagnosis     Diastolic Dysfunction     Coronary Artery Disease     Hyperlipidemia with target LDL less than 70       Past Surgical History  He has a past surgical history that includes pr cabg, vein, single; Cardiac catheterization; and Coronary artery bypass graft (2014).     History of Present Illness   This 60 y.o. old man comes in for annual physical.  Overall he has been doing okay.  Last time I saw him he was having some GI symptoms including lower abdominal pain and change in bowel habits.  I did a CT scan which did not show any  diverticulitis.  He had a colonoscopy which showed some diverticulosis and polyps.  5-year follow-up recommended.  Over the past year he has had some issues with diarrhea and sensitivity to certain foods such as gluten and lactose currently he had a burger/sandwich 3 weeks ago with a gluten of on and this caused quite a bit of distress.  He will occasionally get a sharp pain in his chest but nothing sustained and often associated with movement.  He has excellent exercise tolerance and he is skis and bikes regularly at a high level.    Review of Systems: A comprehensive review of systems was negative except as noted.     Medications and Allergies   Current Outpatient Medications   Medication Sig Dispense Refill     ASCORBATE CALCIUM (VITAMIN C ORAL) Take 1 tablet by mouth daily as needed.              aspirin 325 MG tablet Take 162 mg by mouth daily. Per patient 8/14/15       atorvastatin (LIPITOR) 40 MG tablet TAKE 1 TABLET BY MOUTH AT BEDTIME 90 tablet 0     cholecalciferol, vitamin D3, 5,000 unit Tab Take 1 tablet by mouth. TAKE 1 TABLET 5 X WEEK       coenzyme Q10 (CO Q-10) 200 mg capsule Take 200 mg by mouth daily.       Lactobacillus rhamnosus GG (CULTURELLE) 10-15 Billion cell capsule Take 1 capsule by mouth daily.       multivitamin therapeutic tablet Take 1 tablet by mouth daily.       nitroglycerin (NITROSTAT) 0.4 MG SL tablet Place 1 tablet (0.4 mg total) under the tongue every 5 (five) minutes as needed for chest pain. 25 tablet 5     omega-3 fatty acids-vitamin E (FISH OIL) 1,000 mg cap Take 1 capsule by mouth 2 (two) times a day.       psyllium husk (METAMUCIL ORAL) Take by mouth.       No current facility-administered medications for this visit.      No Known Allergies     Family and Social History   Family History   Problem Relation Age of Onset     Heart attack Brother      No Medical Problems Mother         89     No Medical Problems Father         97     Cervical cancer Sister      Heart attack  "Brother      Heart attack Brother      Prostate cancer Brother      No Medical Problems Brother      No Medical Problems Sister      No Medical Problems Sister      No Medical Problems Sister      No Medical Problems Sister      No Medical Problems Sister      No Medical Problems Sister      No Medical Problems Daughter      No Medical Problems Son         Social History     Tobacco Use     Smoking status: Never Smoker     Smokeless tobacco: Never Used   Substance Use Topics     Alcohol use: Not Currently     Frequency: Never     Comment: 1988 stopped drinking     Drug use: Never        Physical Exam   General Appearance:   No acute distress    /60   Pulse 60   Ht 6' 0.25\" (1.835 m)   Wt 173 lb (78.5 kg)   SpO2 99%   BMI 23.30 kg/m      EYES: Eyelids, conjunctiva, and sclera were normal. Pupils were normal. Cornea, iris, and lens were normal bilaterally.  HEAD, EARS, NOSE, MOUTH, AND THROAT: Head and face were normal. Hearing was normal to voice and the ears were normal to external exam. Nose appearance was normal and there was no discharge. Oropharynx was normal.  NECK: Neck appearance was normal. There were no neck masses and the thyroid was not enlarged.  RESPIRATORY: Breathing pattern was normal and the chest moved symmetrically.  Percussion/auscultatory percussion was normal.  Lung sounds were normal and there were no abnormal sounds.  CARDIOVASCULAR: Heart rate and rhythm were normal.  S1 and S2 were normal and there were no extra sounds or murmurs. Peripheral pulses in arms and legs were normal.  Jugular venous pressure was normal.  There was no peripheral edema.  GASTROINTESTINAL: The abdomen was normal in contour.  Bowel sounds were present.  Percussion detected no organ enlargement or tenderness.  Palpation detected no tenderness, mass, or enlarged organs.   MUSCULOSKELETAL: Skeletal configuration was normal and muscle mass was normal for age. Joint appearance was overall normal.  LYMPHATIC: " There were no enlarged nodes.  SKIN/HAIR/NAILS: Skin color was normal.  There were no skin lesions.  Hair and nails were normal.  NEUROLOGIC: The patient was alert and oriented to person, place, time, and circumstance. Speech was normal. Cranial nerves were normal. Motor strength was normal for age. The patient was normally coordinated.  PSYCHIATRIC:  Mood and affect were normal and the patient had normal recent and remote memory. The patient's judgment and insight were normal.       Additional Information        Matt Walker MD  Internal Medicine  Contact me at [phone]

## 2021-06-16 PROBLEM — E78.5 HYPERLIPIDEMIA WITH TARGET LDL LESS THAN 70: Status: ACTIVE | Noted: 2019-01-16

## 2021-06-16 NOTE — TELEPHONE ENCOUNTER
Telephone Encounter by Kylie Orourke RN at 9/11/2019  1:51 PM     Author: Kylie Orourke RN Service: -- Author Type: Registered Nurse    Filed: 9/11/2019  2:01 PM Encounter Date: 9/11/2019 Status: Signed    : Kylie Orourke RN (Registered Nurse)       Coronary CTA ordered.  -Jacoby Pichardo MD to Viral Martinez    9/6/19 1:27 PM   Hi Rian   I received your message from the nurse.  I think it still seems reasonable to do the CT angiogram as you have had some intermittent symptoms and you do push yourself quite hard with exercise.  Given that you are 5 years out from your bypass stress testing versus CT angiography would seem reasonable and certainly I would be comfortable with stress testing if you prefer to do that.  Let me know your thoughts Luis Fernando Wood      Last read by Viral Martinez at 3:11 PM on 9/6/2019.       September 1, 2019   Jacoby Wood MD to Viral Martinez    9:59 AM   Hard to be certain as to the cause given that it is non exertional, we have discussed pursuing the CT angiogram.How do you feel about that plan? If getting more significant please consider more urgent evaluation.Let me know your thoughts and we can plan to proceed. CRISTIAN Wood      Last read by Viral Martinez at 3:13 PM on 9/6/2019.

## 2021-06-16 NOTE — TELEPHONE ENCOUNTER
Telephone Encounter by Kylie Orourke RN at 11/5/2019  9:06 AM     Author: Kylie Orourke RN Service: -- Author Type: Registered Nurse    Filed: 11/5/2019  9:09 AM Encounter Date: 11/5/2019 Status: Addendum    : Kylie Orourke RN (Registered Nurse)    Related Notes: Original Note by Kylie Orourke RN (Registered Nurse) filed at 11/5/2019  9:06 AM       LM asking pt to call [phone] to discuss.  -Jacoby Pichardo MD to Viral Martinez   10/28/19 5:02 PM   Bear Modi   I wanted to update you on my conversation with , fourth graft is not located.  Oftentimes we can see at least a small residual graft but we have looked at it and we cannot identify its location.  Potentially its occluded and we just cannot see it as a surgical report discusses 4 vessel bypass.  Given lack of symptoms we can monitor closely or as we discussed in the office proceed to follow-up stress testing just to document the stability.  Let me know your thoughts.Luis Fernando Wood      ----- Message -----  From: Kori Bermudez MD  Sent: 10/11/2019   3:39 PM CST  To: Jacoby Wood MD    It is so clear that the patient only left 3 grafts.  There is graft spot in the front of ascending aorta which could be occluded grafts, but I did not see graft to D1.    ----- Message -----  From: Jacoby Wood MD  Sent: 10/11/2019   9:33 AM CDT  To: MD Bear Fierro  If you remember this was the CT that you read where there was a vein graft to the diagonal for the operative port that could not be seen.  The patient is very in tuned with his body and history of hoping we can find a time to look at it together to see if we can find an occluded graft to try to further define the story.  Thanks for your help Luis Fernando

## 2021-06-17 NOTE — TELEPHONE ENCOUNTER
"Telephone Encounter by Winter Villanueva RN at 2/8/2021  9:02 AM     Author: Winter Villanueva RN Service: -- Author Type: Registered Nurse    Filed: 2/8/2021  9:29 AM Encounter Date: 2/8/2021 Status: Signed    : Winter Villanueva RN (Registered Nurse)     Summary: Novavax vaccine study          Study name PREVENT-19  Protocol Title:  A Phase 3, Randomized, Observer-Blinded, Placebo-Controlled Study to Evaluate the Efficacy, Safety, and Immunogenicity of a SARS-CoV-2 Recombinant Eric Protein Nanoparticle Vaccine (SARS-CoV-2 rS) with Matrix-M1? Adjuvant in Adult Participants ? 18 Years     Research Winter Villanueva RN nurse associate spoke with Viral Martinez and/or their family by phone to discuss participation in the Novavax vaccine study.    The study discussion continued with an introduction of the study purpose and the qualifications for participation.     The consent discussion included the following:    Description of trial    Number of Participants    Risks and Discomforts    Unforeseeable Risks    Benefits    Alternative Procedures or Treatments    Confidentiality    Compensation and Medical Treatments in Event of Injury    Contacts    Voluntary Participation    Involuntary Termination of Participant's Participation    Additional Costs to Participant    Consequences of Participant's Decision to Withdraw    Providing Significant New Findings to Participants      Viral Martinez was provided time to consider participation and ask questions.  All questions answered to his satisfaction.  Viral Martinez was queried regarding their understanding of the trial. he was able to correctly answer the following questions:    Double blind placebo control    Follow up visits    Need to report side effects and/or possible COVID-19 symptoms       Viral Martinez has preliminarily agreed to participate in the \"PREVENT-19\" COVID-19 vaccine clinical trial.  he will sign consent form in person on 02- after in person continuation " of consent process. This visit is scheduled for 1230.    Winter Villanueva RN    A review of medical history and medications was done to screen for items that may impact ability to participate in the trial.     Most recent Influenza vaccine:  None   Must be >4 days prior to Novavax vaccine  Any other vaccines (name & dates):  None   Must be >7 days prior to Novavax vaccine    Medical History  Need: Start date (year @ minimum), end date (year @ minimum), or ongoing  Past Medical History:   Diagnosis Date   ? Coronary artery disease    ? Family history of myocardial infarction     brother 62   ? GERD (gastroesophageal reflux disease)    ? High cholesterol     dislipidemia       Hospitalized in last 6 months? [] Yes   [x] No        Winter Villanueva RN

## 2021-06-18 NOTE — PATIENT INSTRUCTIONS - HE
Patient Instructions by Evelyn Martinez RN at 3/16/2021 10:30 AM     Author: Evelyn Martinez RN Service: -- Author Type: Registered Nurse    Filed: 3/16/2021 10:42 AM Encounter Date: 3/16/2021 Status: Signed    : Evelyn Martinez RN (Registered Nurse)           Thank you for coming in today for the Day 35 visit.  Your next study visit is Month 3.   Reminder: this visit will be at the [place] [place] [facility] ([facility]).  The address for the [facility] is: [address] [place] [address]  The phone number is [phone]  We have provided you with a map for your reference too.    The study team from the [facility] will be in contact with you to schedule your next study visit.     The PREVENT-19 team at [facility] wishes you all the best and appreciates the opportunity to partner with you in this vaccine trial.  We wish you all the best in the future.    Evelyn Martinez RN

## 2021-06-18 NOTE — PATIENT INSTRUCTIONS - HE
Patient Instructions by Mariya Alba RN at 2/9/2021 12:30 PM     Author: Mariya Alba RN Service: -- Author Type: Registered Nurse    Filed: 2/9/2021  1:11 PM Encounter Date: 2/9/2021 Status: Signed    : Mariya Alba RN (Registered Nurse)         Thank you for coming in today and agreeing to participate in the PREVENT-19 COVID-19 Vaccine Clinical Trial     Your next appointment is in 21 days.   You are going home with the following items    A ruler to measure any injection site reaction that might happen. This includes areas of redness, swelling, or warmth    A kit for collection of nasal swabs for you to use as directed  o Instructions on how to collect the nasal swab    Participant Guidance Document    Folder with your copy of the consent form with copies of the signature pages    A thermometer and instructions for taking your temperature    [employer] (payment debit card): As stated within the consent form, you will receive these funds within 30 days of your visit.  While we will work to have the funds available as soon as possible, you should expect it will take a minimum of 2 weeks.  If you do not have funds loaded by your Day 21 visit, please let us know.  If you have issues with the card, please call us at [phone].      Mariya Alba RN

## 2021-06-20 NOTE — LETTER
Letter by Jacoby Wood MD at      Author: Jacoby Wood MD Service: -- Author Type: --    Filed:  Encounter Date: 9/18/2020 Status: (Other)         Viral Martinez  [address] [facility] [address]      September 18, 2020      Dear Viral,    This letter is to remind you that you will be due for your follow up appointment with Dr. Jacoby Wood in October, 2020 . To help ensure you are in the best health possible, a regular follow-up with your cardiologist is essential.     Please call our Patient Scheduling Line at [phone] to schedule your appointment at your earliest convenience.  If you have recently scheduled an appointment, please disregard this letter.    We look forward to seeing you again. As always, we are available at the number  above for any questions or concerns you may have.      Sincerely,     The Physicians and Staff of [facility]

## 2021-06-23 NOTE — PATIENT INSTRUCTIONS - HE
Nice to visit today.  We discussed CT coronary angiogram to directly visualize the grafts in your native vessels versus repeat stress test.  Please call me with questions and additional thoughts regarding any of the tests or questions in general.  We are going to plan fasting laboratory studies in the next few weeks and will call you with the results.My nurse is Kylie and her number is [phone]

## 2021-06-26 ENCOUNTER — HEALTH MAINTENANCE LETTER (OUTPATIENT)
Age: 61
End: 2021-06-26

## 2021-06-27 NOTE — PROGRESS NOTES
Progress Notes by Jacoby Wood MD at 1/16/2019 10:10 AM     Author: Jacoby Wood MD Service: -- Author Type: Physician    Filed: 1/17/2019 10:18 AM Encounter Date: 1/16/2019 Status: Signed    : Jacoby Wood MD (Physician)       [facility] Progress Note    Assessment:  1.  Coronary artery disease with bypass surgery in 2014.  He underwent left internal mammary artery to left anterior descending, saphenous vein graft to a diagonal/obtuse marginal and right posterior descending coronary arteries.  He had a negative stress echocardiogram in 2017.  He needs to be a vigorous exerciser.  We did discuss warming up and cooling down.  We discussed possible CT angiography versus follow-up stress testing given some atypical chest discomfort.  He is now 5 years out from the procedure.  He was going to do some additional thinking about these options and be back in touch with me.    2.  Dyslipidemia.  Laboratory studies dated October 2016 where his LDL cholesterol was 66.  Liver function tests were normal.  Given that it is been a few years we are going to plan follow-up laboratory studies will comment once is available for review.      Plan: As outlined above with follow-up in 6-12 months.    1. CAD (coronary artery disease)  Comprehensive Metabolic Panel    Lipid Profile    Magnesium    Vitamin D, Total (25-Hydroxy)   2. Hyperlipidemia with target LDL less than 70           An After Visit Summary was printed and given to the patient.    Subjective:    Viral Martinez is a 58 y.o. male who returned for a planned  follow up visit.  He continues to be very active.  He exercises on a regular basis at a high level.  He does not describe classic chest discomfort but does have some effort in his upper chest primarily when he is utilizing his arms.  This sounds more musculoskeletal in etiology.  His presentation at the time of surgery was diminished exercise tolerance and increased shortness of breath  which she has not experienced.  He reports no complaints of dizziness or lightheadedness, syncope or near syncope.  He states he has been doing a better job keeping himself hydrated.    Review of Systems:   General: WNL  Eyes: WNL  Ears/Nose/Throat: WNL  Lungs: WNL  Heart: WNL  Stomach: WNL  Bladder: WNL  Muscle/Joints: WNL  Skin: WNL  Nervous System: WNL  Mental Health: WNL     Blood: WNL      Problem List:    Patient Active Problem List   Diagnosis   ? Diastolic Dysfunction   ? Esophageal Reflux   ? Coronary Artery Disease   ? Lightheaded   ? Hyperlipidemia with target LDL less than 70       Social History     Socioeconomic History   ? Marital status: Single     Spouse name: Not on file   ? Number of children: Not on file   ? Years of education: Not on file   ? Highest education level: Not on file   Social Needs   ? Financial resource strain: Not on file   ? Food insecurity - worry: Not on file   ? Food insecurity - inability: Not on file   ? Transportation needs - medical: Not on file   ? Transportation needs - non-medical: Not on file   Occupational History   ? Not on file   Tobacco Use   ? Smoking status: Never Smoker   ? Smokeless tobacco: Never Used   Substance and Sexual Activity   ? Alcohol use: Not on file   ? Drug use: Not on file   ? Sexual activity: Not on file   Other Topics Concern   ? Not on file   Social History Narrative   ? Not on file       Family History   Problem Relation Age of Onset   ? Heart attack Brother        Current Outpatient Medications   Medication Sig Dispense Refill   ? ASCORBATE CALCIUM (VITAMIN C ORAL) Take by mouth.     ? aspirin 325 MG tablet Take 162 mg by mouth daily. Per patient 8/14/15     ? atorvastatin (LIPITOR) 40 MG tablet Take 1 tablet (40 mg total) by mouth at bedtime. 45 tablet 0   ? cholecalciferol, vitamin D3, 5,000 unit Tab Take 1 tablet by mouth. TAKE 1 TABLET 5 X WEEK     ? coenzyme Q10 (CO Q-10) 200 mg capsule Take 200 mg by mouth daily.     ? MAGNESIUM ORAL  "Take by mouth. TAKING 5 DAYS PER WEEK.     ? nitroglycerin (NITROSTAT) 0.4 MG SL tablet Place 1 tablet (0.4 mg total) under the tongue every 5 (five) minutes as needed for chest pain. 25 tablet 5   ? omega-3 fatty acids-vitamin E (FISH OIL) 1,000 mg cap Take 1 capsule by mouth 2 (two) times a day.     ? multivitamin therapeutic (THERAGRAN) tablet Take 1 tablet by mouth daily.       No current facility-administered medications for this visit.        Objective:     /74 (Patient Site: Right Arm, Patient Position: Sitting, Cuff Size: Adult Large)   Pulse 66   Resp 18   Ht 6' 1\" (1.854 m)   Wt 186 lb (84.4 kg)   BMI 24.54 kg/m    186 lb (84.4 kg)       Physical Exam:    GENERAL APPEARANCE: alert, no apparent distress  HEENT: no scleral icterus or xanthelasma  NECK: jugular venous pressure within normal limits  CHEST: symmetric, the lungs are clear to auscultation chest wall scar well-healed  CARDIOVASCULAR: regular rhythm without murmur, click, or gallop; no carotid bruits  Abdomen: No Organomegaly, masses, bruits, or tenderness. Bowels sounds are present      EXTREMITIES: no cyanosis, clubbing or edema, palpable pulses in the dorsalis pedis and posterior tibial distribution bilaterally    Cardiac Testing:  EKG Scan       Scan on: 10/11/17 1:06 PM by:    Indications     CAD (coronary artery disease)   Interpretation Summary       The stress electrocardiogram is negative for inducible ischemic EKG changes.    The patient's exercise tolerance was normal.    Left ventricle ejection fraction is normal. The estimated left ventricular ejection fraction is 60%.    Stress echocardiogram is negative for inducible ischemia.    Appropriate heart rate and blood pressure response to exercise         Lab Results:    Lab Results   Component Value Date     07/01/2014    K 4.0 07/01/2014     07/01/2014    CO2 28 07/01/2014    BUN 16 07/01/2014    CREATININE 0.96 07/01/2014    CALCIUM 9.6 07/01/2014     Lab Results "   Component Value Date    CHOL 125 10/31/2016    TRIG 39 10/31/2016    HDL 51 10/31/2016    LDLDIRECT 48 12/08/2015     BNP (pg/mL)   Date Value   07/01/2014 74 (H)     Creatinine (mg/dL)   Date Value   07/01/2014 0.96   04/13/2014 0.95   04/11/2014 0.86   04/10/2014 0.78     LDL Calculated (mg/dL)   Date Value   10/31/2016 66   11/19/2014 68   07/17/2014 42     Lab Results   Component Value Date    WBC 4.4 07/01/2014    HGB 14.5 07/01/2014    HCT 41.4 07/01/2014    MCV 86 07/01/2014     07/01/2014               This note has been dictated using voice recognition software. Any grammatical or context distortions are unintentional and inherent to the software.      Jacoby Wood M.D., F.A.C.C.  [facility]  [phone]

## 2021-06-28 NOTE — PROGRESS NOTES
Progress Notes by Jacoby Wood MD at 10/11/2019  9:10 AM     Author: Jacoby Wood MD Service: -- Author Type: Physician    Filed: 10/11/2019  9:38 AM Encounter Date: 10/11/2019 Status: Signed    : Jacoby Wood MD (Physician)       [facility] Progress Note    Assessment:  1.  Coronary artery disease with bypass surgery in 2014.  By report and review with a surgical report four-vessel bypass with LIMA to the LAD, saphenous vein graft to a diagonal, saphenous vein graft to an obtuse marginal and saphenous vein graft to the right PDA.  Recent CT angiogram demonstrated 3-4 grafts are nicely patent.  Graft to a diagonal branch was not located.  We discussed this at length we discussed being prudent with respect to his exercise pursuits and discussed follow-up stress testing which he has declined.  He will monitor for specific symptoms and update me with any questions or concerns.    2.  Risk factor modification.  Lipids from great 2019 finds an LDL cholesterol of 65, glycerides 50, AST of 23 ALT of 25.      Plan: As outlined above with follow-up in 6 months.    1. Atherosclerosis of native coronary artery of native heart without angina pectoris     2. Hyperlipidemia with target LDL less than 70           An After Visit Summary was printed and given to the patient.    Subjective:    Viral Martinez is a 59 y.o. male who returned for a planned  follow up visit.  He continues to be a high level exerciser with no specific complaints of chest discomfort.  He did call a a while back with an episode of atypical chest discomfort which is not reason turned.  We pursued CT angiography that demonstrated 3 of the grafts to be widely patent however a reported vein graft to diagonal could not be located.  We discussed this at the time of the result and also discussed today.  We discussed whether we would consider stress testing to exclude ischemia in this territory but he continues to have no  specific cardiovascular symptoms or complaints and would like to hold on follow-up stress testing.  He has not had any significant issues with respect to dizziness, lightheadedness or issues with his with his medications.    Review of Systems:   General: WNL  Eyes: WNL  Ears/Nose/Throat: WNL  Lungs: WNL  Heart: WNL  Stomach: WNL  Bladder: WNL  Muscle/Joints: WNL  Skin: WNL  Nervous System: Dizziness, Loss of Balance  Mental Health: WNL     Blood: WNL      Problem List:    Patient Active Problem List   Diagnosis   ? Diastolic Dysfunction   ? Esophageal Reflux   ? Coronary Artery Disease   ? Lightheaded   ? Hyperlipidemia with target LDL less than 70       Social History     Socioeconomic History   ? Marital status: Single     Spouse name: Not on file   ? Number of children: Not on file   ? Years of education: Not on file   ? Highest education level: Not on file   Occupational History   ? Not on file   Social Needs   ? Financial resource strain: Not on file   ? Food insecurity:     Worry: Not on file     Inability: Not on file   ? Transportation needs:     Medical: Not on file     Non-medical: Not on file   Tobacco Use   ? Smoking status: Never Smoker   ? Smokeless tobacco: Never Used   Substance and Sexual Activity   ? Alcohol use: Not on file   ? Drug use: Not on file   ? Sexual activity: Not on file   Lifestyle   ? Physical activity:     Days per week: Not on file     Minutes per session: Not on file   ? Stress: Not on file   Relationships   ? Social connections:     Talks on phone: Not on file     Gets together: Not on file     Attends [employer] service: Not on file     Active member of club or organization: Not on file     Attends meetings of clubs or organizations: Not on file     Relationship status: Not on file   ? Intimate partner violence:     Fear of current or ex partner: Not on file     Emotionally abused: Not on file     Physically abused: Not on file     Forced sexual activity: Not on file   Other  Topics Concern   ? Not on file   Social History Narrative   ? Not on file       Family History   Problem Relation Age of Onset   ? Heart attack Brother        Current Outpatient Medications   Medication Sig Dispense Refill   ? ASCORBATE CALCIUM (VITAMIN C ORAL) Take 1 tablet by mouth daily as needed.            ? aspirin 325 MG tablet Take 162 mg by mouth daily. Per patient 8/14/15     ? atorvastatin (LIPITOR) 40 MG tablet TAKE 1 TABLET BY MOUTH AT BEDTIME 90 tablet 2   ? cholecalciferol, vitamin D3, 5,000 unit Tab Take 1 tablet by mouth. TAKE 1 TABLET 5 X WEEK     ? coenzyme Q10 (CO Q-10) 200 mg capsule Take 200 mg by mouth daily.     ? MAGNESIUM ORAL Take by mouth. TAKING 5 DAYS PER WEEK.     ? nitroglycerin (NITROSTAT) 0.4 MG SL tablet Place 1 tablet (0.4 mg total) under the tongue every 5 (five) minutes as needed for chest pain. 25 tablet 5   ? omega-3 fatty acids-vitamin E (FISH OIL) 1,000 mg cap Take 1 capsule by mouth 2 (two) times a day.     ? multivitamin therapeutic (THERAGRAN) tablet Take 1 tablet by mouth daily.       No current facility-administered medications for this visit.        Objective:     /70 (Patient Site: Left Arm, Patient Position: Sitting, Cuff Size: Adult Regular)   Pulse 76   Resp 16   Ht 6' (1.829 m)   Wt 181 lb (82.1 kg)   BMI 24.55 kg/m    181 lb (82.1 kg)       Physical Exam:    GENERAL APPEARANCE: alert, no apparent distress  HEENT: no scleral icterus or xanthelasma  NECK: jugular venous pressure within normal limits  CHEST: symmetric, the lungs are clear to auscultation chest wall scar well-healed  CARDIOVASCULAR: regular rhythm without murmur, click, or gallop; no carotid bruits  Abdomen: No Organomegaly, masses, bruits, or tenderness. Bowels sounds are present      EXTREMITIES: no cyanosis, clubbing or edema, good distal pulses    Cardiac Testing:  Echo Stress Exercise   Order# [id number]   Reading physician: Martinez Dotson MD Ordering physician: Jacoby Wood MD  Study date: 10/11/17   Performing Date Performing Department   Oct 11, 2017  CARDIAC TESTING [[id number]]   Patient Information     Patient Name  Viral Martinez MRN  [id number] Sex  Male  1  1960 (57 y.o.)   EKG Scan       Stress Test Data - Scan on 10/11/17 1:06 PM by    Indications     CAD (coronary artery disease)   Interpretation Summary       The stress electrocardiogram is negative for inducible ischemic EKG changes.    The patient's exercise tolerance was normal.    Left ventricle ejection fraction is normal. The estimated left ventricular ejection fraction is 60%.    Stress echocardiogram is negative for inducible ischemia.    Appropriate heart rate and blood pressure response to exercise          CTA Coronary   Order# [id number]   Reading physician: Kori Bermudez MD Ordering physician: Jacoby Wood MD Study date: 19   Patient Information     Patient Name  Viral Martinez MRN  [id number] Sex  Male  1  1960 (59 y.o.)   Indications     chest pain; Coronary artery disease, prior revascularization   Dx: Coronary atherosclerosis [I25.10 (ICD-10-CM)]   Interpretation Summary     Native coronary arteries:  1.  Left main has 60-70% stenosis.    2.  50% stenosis in proximal LAD, 60-70% stenosis in mid LAD.  D1 appears minimal disease.  3.  Ostial LCX has 80-90% stenosis.  The rest LCX and its branches are nonobstructive lesion.  4.  Mild disease in proximal to mid RCA.  Distal RCA has 80-90% stenosis     Graft evaluation:  1.  LIMA to mid LAD is patent.  2.  SVG to OM1 is patent.  3.  SVG to PDA is patent.       Lab Results:    Lab Results   Component Value Date     2019    K 4.1 2019     2019    CO2 29 2019    BUN 15 2019    CREATININE 1.13 2019    CALCIUM 9.6 2019     Lab Results   Component Value Date    CHOL 124 2019    TRIG 50 2019    HDL 49 2019    LDLDIRECT 48 2015     BNP (pg/mL)   Date Value   2014  74 (H)     Creatinine (mg/dL)   Date Value   01/31/2019 1.13   07/01/2014 0.96   04/13/2014 0.95   04/11/2014 0.86     LDL Calculated (mg/dL)   Date Value   01/31/2019 65   10/31/2016 66   11/19/2014 68     Lab Results   Component Value Date    WBC 4.4 07/01/2014    HGB 14.5 07/01/2014    HCT 41.4 07/01/2014    MCV 86 07/01/2014     07/01/2014               This note has been dictated using voice recognition software. Any grammatical or context distortions are unintentional and inherent to the software.      Jacoby Wood M.D., F.A.C.C.  [facility]  [phone]

## 2021-06-30 NOTE — PROGRESS NOTES
"Progress Notes by Mariya Alba RN at 2/9/2021 12:30 PM     Author: Mariya Alba RN Service: -- Author Type: Registered Nurse    Filed: 3/1/2021 10:55 AM Encounter Date: 2/9/2021 Status: Addendum    : Mariya Alba RN (Registered Nurse)    Related Notes: Original Note by Mariya Alba RN (Registered Nurse) filed at 2/17/2021 10:16 AM           Visits Screening/Baseline (1)    Time seated: 1130    The study discussion continued with an introduction of the study purpose and the qualifications for participation.     The consent discussion included the following:    Description of trial    Number of Participants    Risks and Discomforts    Unforeseeable Risks    Benefits    Alternative Procedures or Treatments    Confidentiality    Compensation and Medical Treatments in Event of Injury    Contacts    Voluntary Participation    Involuntary Termination of Participant's Participation    Additional Costs to Participant    Consequences of Participant's Decision to Withdraw    Providing Significant New Findings to Participants      Viral Martinez was provided time to consider participation and ask questions.  All questions answered to his satisfaction.  Viral Martinez was queried regarding their understanding of the trial. he was able to correctly answer the following questions:    Double blind placebo control    Follow up visits    Need to report side effects and/or possible COVID-19 symptoms       iVral Martinez has agreed to participate in the \"PREVENT-19\" COVID-19 vaccine clinical trial.  Consent form signed (version 3, IRB approved Nov 25, 2020), copies of consent signatures provided to participant.  No study procedures performed prior to obtaining consent to participate.       In person, scheduled  In the last 2 weeks, did the participant attend any large gatherings (> 10 people), or come in close contact (<6 feet) with a person known to  have COVID-19, returned to school or to work in-person?  " "[] Yes   [x] No    Reviewed Inclusion/Exclusion criteria--please refer to that note for details and for co-sign by Dr. Henriquez/ZAMZAM.    1960   male  Ethnicity   []  or    [x] Not  or   Race   []  or    []    [] Black or   []  or other    [x] White    Occupation   Attending school in person   [] Yes   [x] No   Currently working    [x] Yes   [x] No    If yes: Required to be in close proximity (<6 ft) to other people?   [x] Yes   [] No   How often is participant required to be present in workplace (I.e., not work from home [[employer]])    [] 0 days/week  [] 1 day/week  [x] 2-4 days/week  [] >5 days/week  Do people in participant's main workplace use PPE? [x] Yes   [] No    Living Situation  How many people live with the subject (other than subject)? 1  Total people under 18 years of age 0  Total people between 18-64 years of age 1  Total people 65 years of age or older  0      Lifestyle  Does the subject have a history of smoking or vaping? [] Yes   [x] No  Is the subject currently smoking or vaping?   [] Yes   [x] No      Randomization  Date of randomization: 2/9/21  Randomization number: 27736  Age group  [x] 18-64 years  [] > 65 years    Physical Exam   Please refer to PA note for PE details  VS-T,P, R, BP, wt, ht  Visit Vitals  /76 (Patient Site: Right Arm, Patient Position: Sitting, Cuff Size: Adult Regular)   Pulse (!) 53 (NCS)   Temp 97.7  F (36.5  C) (Oral)   Resp 18   Ht 6' (1.829 m)   Wt 176 lb (79.8 kg)   SpO2 97%   BMI 23.87 kg/m      Vitals:    02/09/21 1217   Weight: 176 lb (79.8 kg)      Height: 6'0\"      Study labs (Immunogenicity, SARS-CoV-2 (anti-NP))  drawn   [x] Yes   Time 1255 [] No, why?     Nasal swab collection:  [x] Yes   Time 1259 [] No, why?     Administrations This Visit     Study SARS-CoV-2 vaccine vs. placebo (IDS# 5708) injection injection 0.5 mL     Admin Date  02/09/2021 1309 " Action  Given Dose  0.5 mL Route  Intramuscular Administered By  Mariya Alba, RN                    Participant waited in Clinical Research Unit (CRU) for @ least 30 minutes after receiving injection.  Participant experienced no adverse symptoms prior to leaving CRU  Study team reviewed completion instructions with participant  Participant provided with eDiary to record all subsequent AEs and COVID-19 symptomatology.  Participant provided with nasal swab home collection kit and instructions on completion (refer to XXX for complete details)  Participant provided with thermometer and reviewed instructions on how to take oral temperature.  Participant provided with a ruler to measure any site reactions that occur; instructed on how to use.  Participant provided with After Visit Summary that includes these instructions and next appointments.  he confirms that all questions have been answered to his satisfaction.    Plan: Next study visit (Visit 2) scheduled     Discharge time 1339    Mariya Alba RN     Current Outpatient Medications   Medication Start Date: Sig   ? ASCORBATE CALCIUM (VITAMIN C ORAL) 4/15/2015 Take 1 tablet by mouth daily as needed.  Indication: Vitamin         ? aspirin 325 MG tablet 7/17/2014 Take 162 mg by mouth daily. Per patient 8/14/15 Indication: Coronary artery disease   ? atorvastatin (LIPITOR) 40 MG tablet 4/15/2015 TAKE 1 TABLET BY MOUTH AT BEDTIME Indication: High Cholesterol   ? cholecalciferol, vitamin D3, 5,000 unit Tab 1/26/2015 Take 1 tablet by mouth. TAKE 1 TABLET 5 X WEEK Indication: Vitamin   ? coenzyme Q10 (CO Q-10) 200 mg capsule 12/8/2015 Take 200 mg by mouth daily. Indication: Vitamin   ? Lactobacillus rhamnosus GG (CULTURELLE) 10-15 Billion cell capsule Unknown Take 1 capsule by mouth daily. Indication: Probiotic   ? multivitamin therapeutic tablet 4/15/2015 Take 1 tablet by mouth daily. Indication: Vitamin   ? nitroglycerin (NITROSTAT) 0.4 MG SL tablet 7/17/2014 Place  1 tablet (0.4 mg total) under the tongue every 5 (five) minutes as needed for chest pain. Indication: Coronary Artery Disease   ? omega-3 fatty acids-vitamin E (FISH OIL) 1,000 mg cap 1/26/2015 Take 1 capsule by mouth 2 (two) times a day. Indication: Vitamin        Past Medical History:   Diagnosis Date   ? Coronary artery disease 2014   ? Family history of myocardial infarction 2006    brother age 62   ?     ? High cholesterol 2014    dislipidemia

## 2021-06-30 NOTE — PROGRESS NOTES
Progress Notes by Christin Colorado PA-C at 3/16/2021 10:30 AM     Author: Christin Colorado PA-C Service: -- Author Type: Physician Assistant    Filed: 3/16/2021 11:18 AM Encounter Date: 3/16/2021 Status: Signed    : Christin Colorado PA-C (Physician Assistant)             What was the body system examined: Defaulted value based on protocol requirements and will not require .   System  Normal/Abnormal  If abnormal, CS?   If abnormal, describe    Skin  Normal  Not applicable     HEENT Normal  Not applicable     Neck  Normal  Not applicable     Thyroid  Normal  Not applicable      Lungs  Normal  Not applicable     Heart  Normal  Not applicable     Abdomen  Normal  Not applicable     Lymph Nodes  Normal  Not applicable     Musculoskeletal/Extremities  Normal  Not applicable     Other  Normal  Not applicable        What was the overall interpretation: Please select from the dropdown list.  Normal    IVET Chamberlain PA-C

## 2021-06-30 NOTE — PROGRESS NOTES
Progress Notes by Munira Preston RN at 3/2/2021 12:30 PM     Author: Munira Preston RN Service: -- Author Type: Registered Nurse    Filed: 3/2/2021  2:15 PM Encounter Date: 3/2/2021 Status: Signed    : Munira Preston RN (Registered Nurse)           Visit 2  03/02/2021  In person, scheduled  Time seated: 12:09   In the last 2 weeks, did the participant attend any large gatherings (> 10 people), or come in close contact (<6 feet) with a person known to  have COVID-19, returned to school or to work in-person?  [] Yes   [x] No    Reviewed Inclusion/Exclusion criteria--please refer to that note for details and for co-sign by Dr. Henriquez.   Participants meeting the following criterion may be delayed for subsequent vaccination:     Respiratory symptoms in the past 3 days (ie, body temperature of > 38.0 C, cough, sore throat, difficulty breathing).   o Participant may be vaccinated when all symptoms have been resolved for > 3 days.   o Out of window vaccination is allowed for this reason.     Ongoing consent process: review schedule of events for today, and upcoming appointments; provided him with an update on known overall study progress; reviewed eDiary expectations; reiterated expected AEs (especially associated with injection site).  he has no further questions or concerns at this time.     Adverse Event/Serious Adverse Event: asked participant if any AE/SAEs occurred since last contact (02/09/2021)    he denies AE/SAEs     Physical Exam   Please refer to PA note for PE details  VS-T,P, R, BP  Visit Vitals  /74 (Patient Site: Right Arm, Patient Position: Sitting, Cuff Size: Adult Regular)   Pulse (!) 58   Temp 98.2  F (36.8  C) (Oral)   Resp 16   SpO2 99%       Study labs SARS-CoV-2 vaccine immunogenicity (IgG antibody to SARS-CoV-2 S protein, MN, hACE2 inhibition) drawn   [x] Yes   Time  :12:25 [] No, why?   Requisition number: [id number]       Administrations This Visit      Study SARS-CoV-2 vaccine vs. placebo (IDS# 5708) injection injection 0.5 mL     Admin Date  03/02/2021 Action  Given Dose  0.5 mL Route  Intramuscular Administered By  Munira Preston RN              (note time of admin)13:45  Reviewed eDiary with participant.  Study team reviewed completion instructions with participant and questions answered to his satisfaction.  Participant waited in Clinical Research Unit (CRU) for @ least 30 minutes after receiving injection.  Participant experienced no adverse symptoms prior to leaving CRU  Discharge time: 14:15    Plan: Next study visit (Visit 3) scheduled Visit date not found      Munira Preston, RN

## 2021-06-30 NOTE — PROGRESS NOTES
Progress Notes by Christin Colorado PA-C at 3/2/2021 12:30 PM     Author: Christin Colorado PA-C Service: -- Author Type: Physician Assistant    Filed: 3/2/2021  1:01 PM Encounter Date: 3/2/2021 Status: Signed    : Christin Colorado PA-C (Physician Assistant)             What was the body system examined: Defaulted value based on protocol requirements and will not require .   System  Normal/Abnormal  If abnormal, CS?   If abnormal, describe    Skin  Normal  Not applicable     HEENT Normal  Not applicable     Neck  Normal  Not applicable     Thyroid  Normal  Not applicable      Lungs  Normal  Not applicable     Heart  Normal  Not applicable     Abdomen  Normal  Not applicable     Lymph Nodes  Normal  Not applicable     Musculoskeletal/Extremities  Normal  Not applicable     Other  Normal  Not applicable        What was the overall interpretation: Please select from the dropdown list.  Normal    IVET Chamberlain PA-C

## 2021-06-30 NOTE — PROGRESS NOTES
Progress Notes by Evelyn Martinez RN at 3/16/2021 10:30 AM     Author: Evelyn Martinez RN Service: -- Author Type: Registered Nurse    Filed: 4/13/2021 12:31 PM Encounter Date: 3/16/2021 Status: Addendum    : Yana Henriquez MD (Physician)    Related Notes: Original Note by Evelyn Martinez RN (Registered Nurse) filed at 4/13/2021 11:55 AM     Summary: For review by Dr Henriquez          Visit 3    In person, scheduled  In the last 2 weeks, did the participant attend any large gatherings (> 10 people), or come in close contact (<6 feet) with a person known to  have COVID-19, returned to school or to work in-person?  [] Yes   [x] No    Ongoing consent process: review schedule of events for today, and upcoming appointments; provided him with an update on known overall study progress; reviewed eDiary expectations; reiterated expected AEs (especially associated with injection site).  he has no further questions or concerns at this time.     Physical Exam  Please refer to note for PE details  VS-T,P, R, BP, wt, ht  Visit Vitals  /81 (Patient Site: Left Arm, Patient Position: Sitting, Cuff Size: Adult Regular)   Pulse 72   Temp 97.8  F (36.6  C)   Resp 16   SpO2 97%           Study labs ([facility], Immunogenicity, SARS-CoV-2 (anti-NP))  drawn   [x] Yes   Time 1036 [] No, why?   Requisition number: [id number]      Reviewed eDiary with participant.  Study team reviewed completion instructions with participant and questions answered to his satisfaction.      Plan:   NOTE: remind participant that this next visit will occur @ [facility] (noted on AVS)    Adverse Event/Serious Adverse Event: asked participant if any AE/SAEs occurred since last contact    1] Diagnosis/event description (diagnosis preferred):  Increased bleeding  [gums]  AESI?  [] Yes   [x] No    Start date: 9 Feb 2021 - 2 Mar 2021  Date study team was aware of event 16 Mar 2021  When did the adverse event start relative to the most recent  "vaccination? [x] Before [] After    Date resolved (end date): 2 Mar 2021    Severity: ([x] mild /[]  moderate / [] severe)    Outcome: [x] Recovered/resolved     Was treatment given for this event:  [] Yes   [x] No   If yes, provide details  Serious adverse event?   [] Yes   [x] No  Endpoint?  [] Yes   [x] No   Did the adverse event cause the subject to be discontinued from the study? [] Yes   [x] No  What action was taken with study vaccine due to adverse event following first dose?   [] Vaccine Schedule Not Changed  [] Next Vaccine Delayed  [] 2nd Vaccine Not Administered  [x] Not Applicable  What other action was taken in response to the adverse event? None    Is this AE suspected, probable or confirmed to be related to COVID-19?   [facility] (potential immune-mediated medical condition)? [] Yes   [x] No  [facility] (medically attended adverse event) [] Yes   [x] No  Is the event continuing from the Diary Card (solicited local and/or systemic reactogenicity event)? [] Yes   [x] No    Dr. Henriquez: Reasonable possibility that AE is related to study treatment   [] Yes   [x] No    Is this event related to study vaccine? [x] Not Related [] Related  Is this event related to study procedure?[x]  Not Related [] Related    Was the adverse event serious? [] Yes   [x] No  Date of Seriousness Onset (if different from AE Start Date) __________________________________  Did the adverse event result in death? [] Yes   [x] No  Is the adverse event Life Threatening? [] Yes   [x] No  Did the adverse event result in initial or prolonged hospitalization for the subject? [] Yes   [x] No  Is the adverse event associated with a congenital anomaly or birth defect? [] Yes   [x] No  Is the adverse event a medically important event not covered by other \"serious\" criteria? [] Yes   [x] No  Did the adverse event result in persistent or significant disability or incapacity? [] Yes   [x] No      2] Diagnosis/event description (diagnosis preferred):  " Increased bruising  [both gums]  AESI?  [] Yes   [x] No    Start date: 9 Feb 2021 - 2 Mar 2021  Date study team was aware of event 16 Mar 2021  When did the adverse event start relative to the most recent vaccination? [x] Before [] After    Date resolved (end date): 2 Mar 2021    Severity: ([x] mild /[]  moderate / [] severe)    Outcome: [x] Recovered/resolved     Was treatment given for this event:  [] Yes   [x] No   If yes, provide details  Serious adverse event?   [] Yes   [x] No  Endpoint?  [] Yes   [x] No   Did the adverse event cause the subject to be discontinued from the study? [] Yes   [x] No  What action was taken with study vaccine due to adverse event following first dose?   [] Vaccine Schedule Not Changed  [] Next Vaccine Delayed  [] 2nd Vaccine Not Administered  [x] Not Applicable  What other action was taken in response to the adverse event? None    Is this AE suspected, probable or confirmed to be related to COVID-19?   [facility] (potential immune-mediated medical condition)? [] Yes   [x] No  [facility] (medically attended adverse event) [] Yes   [x] No  Is the event continuing from the Diary Card (solicited local and/or systemic reactogenicity event)? [] Yes   [x] No    Dr. Henriquez: Reasonable possibility that AE is related to study treatment   [] Yes   [x] No    Is this event related to study vaccine? [x] Not Related [] Related  Is this event related to study procedure?[x]  Not Related [] Related    Was the adverse event serious? [] Yes   [x] No  Date of Seriousness Onset (if different from AE Start Date) __________________________________  Did the adverse event result in death? [] Yes   [x] No  Is the adverse event Life Threatening? [] Yes   [x] No  Did the adverse event result in initial or prolonged hospitalization for the subject? [] Yes   [x] No  Is the adverse event associated with a congenital anomaly or birth defect? [] Yes   [x] No  Is the adverse event a medically important event not covered by  "other \"serious\" criteria? [] Yes   [x] No  Did the adverse event result in persistent or significant disability or incapacity? [] Yes   [x] No        Evelyn Martinez RN       "

## 2021-06-30 NOTE — PROGRESS NOTES
"Progress Notes by Mariya Alba RN at 2/9/2021 12:30 PM     Author: Mariya Alba RN Service: -- Author Type: Registered Nurse    Filed: 2/9/2021  1:56 PM Encounter Date: 2/9/2021 Status: Signed    : Yana Henriquez MD (Physician)    Related Notes: Original Note by Mariya Alba RN (Registered Nurse) filed at 2/9/2021  1:53 PM           Study Name: PREVENT-19  : Julee Mathur MD   Sub Investigator: Heri Henriquez MD    Protocol version: 3.0, 16 NOV 2020    Inclusion Criteria  Criteria #   Inclusion Criteria (all must be yes)    1  Adults ? 18 years of age at screening who, by virtue of age, race, ethnicity or life circumstances, are considered at substantial risk of exposure to and infection with SARS-CoV-2   Yes   2  Willing and able to give informed consent prior to study enrollment and to comply with study procedu   Yes   3  Participants of childbearing potential (defined as any participant who has experienced menarche and who is NOT surgically sterile [ie, hysterectomy, bilateral tubal ligation, or bilateral oophorectomy] or postmenopausal [defined as amenorrhea at least 12 consecutive months]) must agree to be heterosexually inactive from at least 28 days prior to enrollment and through 3 months after the last vaccination OR agree to consistently use a medically acceptable method of contraception from at least 28 days prior to enrollment and through 3 months after the last vaccination   Yes   4  Is medically stable, as determined by the investigator (based on review of health status, vital signs (TPRBP) medical history, & targeted physical examination (weight)  VS must be within medically acceptable ranges prior to the first vaccination.   Yes   5  Agree to not participate in any other SARS-CoV-2 prevention trial during the study follow-up.   Yes     Exclusion Criteria  Criteria #  -all must be \"no\"    1  Unstable acute or chronic illness. Criteria for unstable medical " conditions include   a. Substantive changes in chronic prescribed medication (change in class or significant change in dose) in the past 2 months  b. Currently undergoing workup of undiagnosed illness that could lead to diagnosis of a new condition   Note: Well-controlled human immunodeficiency virus [HIV] with undetectable HIV RNA and CD4 count > 200 cells/?L for at least 1 year, documented within the last 6 months, is NOT considered an unstable chronic illness.    No   2  Participation in research involving an investigational product (drug/biologic/device) within 45 days prior to first study vaccination   No   3  History of a previous laboratory-confirmed diagnosis of SARS-CoV-2 infection or COVID-19   No   4  Received influenza vaccination or any other adult vaccine within 4 days prior to or within 7 days after either study vaccination   No   5  Autoimmune or immunodeficiency disease/condition (iatrogenic or congenital) requiring ongoing immunomodulatory therapy NOTE: Stable endocrine disorders (eg, thyroiditis, pancreatitis), including stable diabetes mellitus with no history of diabetic ketoacidosis) are NOT excluded   No   6  Chronic administration (defined as > 14 continuous days) of immunosuppressant, systemic glucocorticoids, or other immune-modifying drugs within 90 days prior to first study vaccination. NOTE: An immunosuppressant dose of glucocorticoid is defined as a systemic dose ? 20 mg of prednisone per day or equivalent. The use of topical, inhaled, and nasal glucocorticoids is permitted. Topical tacrolimus and ocular cyclosporin are permitted   No   7  Received immunoglobulin, blood-derived products, or immunosuppressant drugs within 90 days prior to first study vaccination   No   8  Active cancer (malignancy) on therapy within 1 year prior to first study vaccination (with the exception of malignancy cured via excision, at the discretion of the investigator)   No   9  Any known allergies to products  contained in the investigational product.   No   10  Participants who are breastfeeding, pregnant or who plan to become pregnant within 3 months following last study vaccination   No   11  Any other condition that, in the opinion of the investigator, would pose a health risk to the participant if enrolled or could interfere with evaluation of the trial vaccine or interpretation of study results.   No   12  Study team member or first-degree relative of any study team member (inclusive of Sponsor, and study site personnel involved in the study)   No   13  Current participation in any other COVID-19 prevention clinical trial.   No       Subject has met all inclusion criteria and no exclusion criteria have been met.   Subject is ready to fully enrolled in the PREVENT-19 study.    Mariya Alba RN

## 2021-06-30 NOTE — PROGRESS NOTES
Progress Notes by Christin Colorado PA-C at 2/9/2021 12:30 PM     Author: Christin Colorado PA-C Service: -- Author Type: Physician Assistant    Filed: 2/9/2021 12:17 PM Encounter Date: 2/9/2021 Status: Signed    : Christin Colorado PA-C (Physician Assistant)             What was the body system examined: Defaulted value based on protocol requirements and will not require .   System  Normal/Abnormal  If abnormal, CS?   If abnormal, describe    Skin  Normal  Not applicable     HEENT Normal  Not applicable     Neck  Normal  Not applicable     Thyroid  Normal  Not applicable      Lungs  Normal  Not applicable     Heart  Normal  Not applicable     Abdomen  Normal  Not applicable     Lymph Nodes  Normal  Not applicable     Musculoskeletal/Extremities  Normal  Not applicable     Other  Normal  Not applicable        What was the overall interpretation: Please select from the dropdown list.  Normal    IVET Chamberlain PA-C

## 2021-10-16 ENCOUNTER — HEALTH MAINTENANCE LETTER (OUTPATIENT)
Age: 61
End: 2021-10-16

## 2022-03-08 DIAGNOSIS — E78.5 HYPERLIPIDEMIA: ICD-10-CM

## 2022-03-09 RX ORDER — ATORVASTATIN CALCIUM 40 MG/1
TABLET, FILM COATED ORAL
Qty: 90 TABLET | Refills: 3 | Status: SHIPPED | OUTPATIENT
Start: 2022-03-09 | End: 2022-05-20

## 2022-03-09 NOTE — TELEPHONE ENCOUNTER
"Routing refill request to provider for review/approval because:  Labs not current:  LDL  Patient needs to be seen because it has been more than 1 year since last office visit.    Last Written Prescription Date:  3/11/21  Last Fill Quantity: 90,  # refills: 3   Last office visit provider:  2/24/21     Requested Prescriptions   Pending Prescriptions Disp Refills     atorvastatin (LIPITOR) 40 MG tablet [Pharmacy Med Name: Atorvastatin Calcium 40 MG Oral Tablet (Lipitor)] 90 tablet 3     Sig: TAKE 1 TABLET BY MOUTH AT BEDTIME       Statins Protocol Failed - 3/8/2022  1:17 PM        Failed - LDL on file in past 12 months     Recent Labs   Lab Test 02/24/21  1128   LDL 49             Failed - Recent (12 mo) or future (30 days) visit within the authorizing provider's specialty     Patient has had an office visit with the authorizing provider or a provider within the authorizing providers department within the previous 12 mos or has a future within next 30 days. See \"Patient Info\" tab in inbasket, or \"Choose Columns\" in Meds & Orders section of the refill encounter.              Passed - No abnormal creatine kinase in past 12 months     No lab results found.             Passed - Medication is active on med list        Passed - Patient is age 18 or older             Yana Fowelr RN 03/09/22 1:54 PM  "

## 2022-04-02 ENCOUNTER — HEALTH MAINTENANCE LETTER (OUTPATIENT)
Age: 62
End: 2022-04-02

## 2022-05-20 ENCOUNTER — OFFICE VISIT (OUTPATIENT)
Dept: INTERNAL MEDICINE | Facility: CLINIC | Age: 62
End: 2022-05-20
Payer: COMMERCIAL

## 2022-05-20 VITALS
OXYGEN SATURATION: 98 % | SYSTOLIC BLOOD PRESSURE: 111 MMHG | WEIGHT: 180.4 LBS | TEMPERATURE: 97.7 F | HEART RATE: 60 BPM | RESPIRATION RATE: 16 BRPM | DIASTOLIC BLOOD PRESSURE: 70 MMHG | HEIGHT: 72 IN | BODY MASS INDEX: 24.43 KG/M2

## 2022-05-20 DIAGNOSIS — Z12.5 SCREENING FOR PROSTATE CANCER: ICD-10-CM

## 2022-05-20 DIAGNOSIS — E55.9 VITAMIN D DEFICIENCY: ICD-10-CM

## 2022-05-20 DIAGNOSIS — Z00.00 ANNUAL PHYSICAL EXAM: Primary | ICD-10-CM

## 2022-05-20 DIAGNOSIS — L30.9 ECZEMA, UNSPECIFIED TYPE: ICD-10-CM

## 2022-05-20 DIAGNOSIS — E78.5 HYPERLIPIDEMIA WITH TARGET LDL LESS THAN 70: ICD-10-CM

## 2022-05-20 DIAGNOSIS — I50.30 HEART FAILURE WITH PRESERVED EJECTION FRACTION, NYHA CLASS I (H): ICD-10-CM

## 2022-05-20 DIAGNOSIS — I25.10 CORONARY ARTERY DISEASE INVOLVING NATIVE CORONARY ARTERY OF NATIVE HEART WITHOUT ANGINA PECTORIS: ICD-10-CM

## 2022-05-20 DIAGNOSIS — R73.9 HYPERGLYCEMIA: ICD-10-CM

## 2022-05-20 LAB
ALBUMIN SERPL-MCNC: 4 G/DL (ref 3.5–5)
ALBUMIN UR-MCNC: NEGATIVE MG/DL
ALP SERPL-CCNC: 57 U/L (ref 45–120)
ALT SERPL W P-5'-P-CCNC: 26 U/L (ref 0–45)
ANION GAP SERPL CALCULATED.3IONS-SCNC: 8 MMOL/L (ref 5–18)
APPEARANCE UR: CLEAR
AST SERPL W P-5'-P-CCNC: 29 U/L (ref 0–40)
BILIRUB SERPL-MCNC: 0.5 MG/DL (ref 0–1)
BILIRUB UR QL STRIP: NEGATIVE
BUN SERPL-MCNC: 17 MG/DL (ref 8–22)
CALCIUM SERPL-MCNC: 9.6 MG/DL (ref 8.5–10.5)
CHLORIDE BLD-SCNC: 105 MMOL/L (ref 98–107)
CHOLEST SERPL-MCNC: 127 MG/DL
CO2 SERPL-SCNC: 29 MMOL/L (ref 22–31)
COLOR UR AUTO: YELLOW
CREAT SERPL-MCNC: 0.94 MG/DL (ref 0.7–1.3)
ERYTHROCYTE [DISTWIDTH] IN BLOOD BY AUTOMATED COUNT: 13 % (ref 10–15)
FASTING STATUS PATIENT QL REPORTED: NO
GFR SERPL CREATININE-BSD FRML MDRD: >90 ML/MIN/1.73M2
GLUCOSE BLD-MCNC: 96 MG/DL (ref 70–125)
GLUCOSE UR STRIP-MCNC: NEGATIVE MG/DL
HBA1C MFR BLD: 6.1 % (ref 0–5.6)
HCT VFR BLD AUTO: 42.1 % (ref 40–53)
HDLC SERPL-MCNC: 52 MG/DL
HGB BLD-MCNC: 14.1 G/DL (ref 13.3–17.7)
HGB UR QL STRIP: NEGATIVE
KETONES UR STRIP-MCNC: NEGATIVE MG/DL
LDLC SERPL CALC-MCNC: 64 MG/DL
LEUKOCYTE ESTERASE UR QL STRIP: NEGATIVE
MCH RBC QN AUTO: 29.4 PG (ref 26.5–33)
MCHC RBC AUTO-ENTMCNC: 33.5 G/DL (ref 31.5–36.5)
MCV RBC AUTO: 88 FL (ref 78–100)
NITRATE UR QL: NEGATIVE
PH UR STRIP: 7 [PH] (ref 5–8)
PLATELET # BLD AUTO: 196 10E3/UL (ref 150–450)
POTASSIUM BLD-SCNC: 4.7 MMOL/L (ref 3.5–5)
PROT SERPL-MCNC: 6.6 G/DL (ref 6–8)
PSA SERPL-MCNC: 0.84 UG/L (ref 0–4.5)
RBC # BLD AUTO: 4.79 10E6/UL (ref 4.4–5.9)
SODIUM SERPL-SCNC: 142 MMOL/L (ref 136–145)
SP GR UR STRIP: 1.01 (ref 1–1.03)
TRIGL SERPL-MCNC: 54 MG/DL
UROBILINOGEN UR STRIP-ACNC: 0.2 E.U./DL
WBC # BLD AUTO: 4.8 10E3/UL (ref 4–11)

## 2022-05-20 PROCEDURE — 36415 COLL VENOUS BLD VENIPUNCTURE: CPT | Performed by: INTERNAL MEDICINE

## 2022-05-20 PROCEDURE — 80061 LIPID PANEL: CPT | Performed by: INTERNAL MEDICINE

## 2022-05-20 PROCEDURE — 81003 URINALYSIS AUTO W/O SCOPE: CPT | Performed by: INTERNAL MEDICINE

## 2022-05-20 PROCEDURE — 80053 COMPREHEN METABOLIC PANEL: CPT | Performed by: INTERNAL MEDICINE

## 2022-05-20 PROCEDURE — G0103 PSA SCREENING: HCPCS | Performed by: INTERNAL MEDICINE

## 2022-05-20 PROCEDURE — 83036 HEMOGLOBIN GLYCOSYLATED A1C: CPT | Performed by: INTERNAL MEDICINE

## 2022-05-20 PROCEDURE — 85027 COMPLETE CBC AUTOMATED: CPT | Performed by: INTERNAL MEDICINE

## 2022-05-20 PROCEDURE — 99396 PREV VISIT EST AGE 40-64: CPT | Performed by: INTERNAL MEDICINE

## 2022-05-20 RX ORDER — MULTIVITAMIN
1 TABLET ORAL EVERY OTHER DAY
Start: 2022-05-20

## 2022-05-20 RX ORDER — ATORVASTATIN CALCIUM 40 MG/1
40 TABLET, FILM COATED ORAL AT BEDTIME
Qty: 90 TABLET | Refills: 3
Start: 2022-05-20 | End: 2023-03-14

## 2022-05-20 RX ORDER — TRIAMCINOLONE ACETONIDE 1 MG/G
OINTMENT TOPICAL 2 TIMES DAILY
Qty: 80 G | Refills: 0 | Status: SHIPPED | OUTPATIENT
Start: 2022-05-20

## 2022-05-20 RX ORDER — PNV NO.95/FERROUS FUM/FOLIC AC 28MG-0.8MG
1 TABLET ORAL DAILY
Start: 2022-05-20

## 2022-05-20 NOTE — PROGRESS NOTES
Office Visit - Physical   Viral Martinez   61 year old  male    Date of visit: 5/20/2022  Physician: Matt Walker MD     Assessment and Plan   1. Annual physical exam  This is a 61-year-old man with issues as discussed below.  Ongoing healthy lifestyle discussed and recommended  - Ascorbic Acid 500 MG CHEW; Take 1 tablet by mouth every other day  - multivitamin (ONE-DAILY) tablet; Take 1 tablet by mouth every other day    2. CAD, s/p CABG 2014  Continue secondary prevention, follow-up with Dr. Jacoby Wood  - atorvastatin (LIPITOR) 40 MG tablet; Take 1 tablet (40 mg) by mouth At Bedtime  Dispense: 90 tablet; Refill: 3  - CBC with platelets; Future  - Comprehensive metabolic panel; Future  - Lipid panel reflex to direct LDL Fasting; Future  - UA reflex to Microscopic and Culture; Future  - CBC with platelets  - Comprehensive metabolic panel  - Lipid panel reflex to direct LDL Fasting  - UA reflex to Microscopic and Culture    3. Hyperlipidemia with target LDL less than 70  Continue statin  - Omega-3 Fatty Acids (FISH OIL OMEGA-3) 1000 MG CAPS; Take 1 capsule by mouth daily    4. Heart failure with preserved ejection fraction, NYHA class I (H)  Stable fairly asymptomatic    5. Screening for prostate cancer  - Prostate Specific Antigen Screen; Future  - Prostate Specific Antigen Screen    6. Vitamin D deficiency  - vitamin D3 (CHOLECALCIFEROL) 125 MCG (5000 UT) tablet; Take 1 tablet (125 mcg) by mouth every other day    7. Hyperglycemia  - Hemoglobin A1c; Future  - Hemoglobin A1c    8. Eczema, unspecified type  - triamcinolone (KENALOG) 0.1 % external ointment; Apply topically 2 times daily  Dispense: 80 g; Refill: 0    Return in about 1 year (around 5/20/2023) for Routine preventive.     Chief Complaint   Chief Complaint   Patient presents with     Physical     Recheck Medication        Patient Profile   Social History     Social History Narrative    Lives with his partner, Ruba Cox (cardiac nurse).  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  Son Lambert and daughter Keli.        Past Medical History   Patient Active Problem List   Diagnosis     Heart failure with preserved ejection fraction, NYHA class I (H)     CAD, s/p CABG 2014     Hyperlipidemia with target LDL less than 70       Past Surgical History  He has a past surgical history that includes CABG, VEIN, SINGLE; Cardiac catheterization; and Bypass graft artery coronary (2014).     History of Present Illness   This 61 year old man comes in for annual physical.  Overall doing well.  No specific complaints.  Little bit of a rash on his left leg.  Did a lot of cross-country skiing this winter    Review of Systems: A comprehensive review of systems was negative except as noted.     Medications and Allergies   Current Outpatient Medications   Medication Sig Dispense Refill     Ascorbic Acid 500 MG CHEW Take 1 tablet by mouth every other day       aspirin 325 MG tablet [ASPIRIN 325 MG TABLET] Take 162 mg by mouth daily. Per patient 8/14/15       atorvastatin (LIPITOR) 40 MG tablet Take 1 tablet (40 mg) by mouth At Bedtime 90 tablet 3     coenzyme Q10 (CO Q-10) 200 mg capsule [COENZYME Q10 (CO Q-10) 200 MG CAPSULE] Take 200 mg by mouth daily.       Lactobacillus rhamnosus GG (CULTURELLE) 10-15 Billion cell capsule [LACTOBACILLUS RHAMNOSUS GG (CULTURELLE) 10-15 BILLION CELL CAPSULE] Take 1 capsule by mouth daily.       multivitamin (ONE-DAILY) tablet Take 1 tablet by mouth every other day       nitroglycerin (NITROSTAT) 0.4 MG SL tablet [NITROGLYCERIN (NITROSTAT) 0.4 MG SL TABLET] Place 1 tablet (0.4 mg total) under the tongue every 5 (five) minutes as needed for chest pain. 25 tablet 5     Omega-3 Fatty Acids (FISH OIL OMEGA-3) 1000 MG CAPS Take 1 capsule by mouth daily       psyllium husk (METAMUCIL ORAL) [PSYLLIUM HUSK (METAMUCIL ORAL)] Take by mouth.       triamcinolone (KENALOG) 0.1 % external ointment Apply topically 2 times daily 80 g 0     vitamin D3 (CHOLECALCIFEROL) 125 MCG (5000  UT) tablet Take 1 tablet (125 mcg) by mouth every other day       No Known Allergies     Family and Social History   Family History   Problem Relation Age of Onset     No Known Problems Mother         89     No Known Problems Father         97     Cervical Cancer Sister      No Known Problems Sister      No Known Problems Sister      No Known Problems Sister      No Known Problems Sister      No Known Problems Sister      No Known Problems Sister      Coronary Artery Disease Brother      Coronary Artery Disease Brother      Coronary Artery Disease Brother      Prostate Cancer Brother      No Known Problems Brother      No Known Problems Daughter      No Known Problems Son         Social History     Tobacco Use     Smoking status: Never Smoker     Smokeless tobacco: Never Used   Substance Use Topics     Alcohol use: Not Currently     Comment: Alcoholic Drinks/day: 1988 stopped drinking     Drug use: Never        Physical Exam   General Appearance:   No acute distress    /70 (BP Location: Left arm, Patient Position: Sitting, Cuff Size: Adult Regular)   Pulse 60   Temp 97.7  F (36.5  C) (Axillary)   Resp 16   Ht 1.829 m (6')   Wt 81.8 kg (180 lb 6.4 oz)   SpO2 98%   BMI 24.47 kg/m      EYES: Eyelids, conjunctiva, and sclera were normal. Pupils were normal. Cornea, iris, and lens were normal bilaterally.  HEAD, EARS, NOSE, MOUTH, AND THROAT: Head and face were normal. Hearing was normal to voice and the ears were normal to external exam.   NECK: Neck appearance was normal. There were no neck masses and the thyroid was not enlarged.  RESPIRATORY: Breathing pattern was normal and the chest moved symmetrically.  Percussion/auscultatory percussion was normal.  Lung sounds were normal and there were no abnormal sounds.  CARDIOVASCULAR: Heart rate and rhythm were normal.  S1 and S2 were normal and there were no extra sounds or murmurs. Peripheral pulses in arms and legs were normal.  Jugular venous pressure was  normal.  There was no peripheral edema.  GASTROINTESTINAL: The abdomen was normal in contour.  Bowel sounds were present.  Percussion detected no organ enlargement or tenderness.  Palpation detected no tenderness, mass, or enlarged organs.   MUSCULOSKELETAL: Skeletal configuration was normal and muscle mass was normal for age. Joint appearance was overall normal.  LYMPHATIC: There were no enlarged nodes.  SKIN/HAIR/NAILS: Skin color was normal.  There were no skin lesions.  Hair and nails were normal.  He has an eczematous type rash in his left leg  NEUROLOGIC: The patient was alert and oriented to person, place, time, and circumstance. Speech was normal. Cranial nerves were normal. Motor strength was normal for age. The patient was normally coordinated.  PSYCHIATRIC:  Mood and affect were normal and the patient had normal recent and remote memory. The patient's judgment and insight were normal.       Additional Information        Matt Walker MD  Internal Medicine  Contact me at [phone]  Answers for HPI/ROS submitted by the patient on 5/20/2022  Frequency of exercise:: 4-5 days/week  Getting at least 3 servings of Calcium per day:: Yes  Diet:: Gluten-free/reduced  Taking medications regularly:: Yes  Medication side effects:: Muscle aches  Bi-annual eye exam:: NO  Dental care twice a year:: Yes  Sleep apnea or symptoms of sleep apnea:: Excessive snoring  Additional concerns today:: Yes  Duration of exercise:: 45-60 minutes

## 2022-10-01 ENCOUNTER — HEALTH MAINTENANCE LETTER (OUTPATIENT)
Age: 62
End: 2022-10-01

## 2023-03-14 ENCOUNTER — OFFICE VISIT (OUTPATIENT)
Dept: CARDIOLOGY | Facility: CLINIC | Age: 63
End: 2023-03-14
Payer: COMMERCIAL

## 2023-03-14 VITALS
WEIGHT: 180 LBS | RESPIRATION RATE: 14 BRPM | HEART RATE: 68 BPM | SYSTOLIC BLOOD PRESSURE: 118 MMHG | DIASTOLIC BLOOD PRESSURE: 64 MMHG | BODY MASS INDEX: 24.38 KG/M2 | HEIGHT: 72 IN

## 2023-03-14 DIAGNOSIS — I25.10 CORONARY ARTERY DISEASE INVOLVING NATIVE CORONARY ARTERY OF NATIVE HEART WITHOUT ANGINA PECTORIS: ICD-10-CM

## 2023-03-14 DIAGNOSIS — I25.10 CORONARY ARTERY DISEASE DUE TO LIPID RICH PLAQUE: Primary | ICD-10-CM

## 2023-03-14 DIAGNOSIS — I25.83 CORONARY ARTERY DISEASE DUE TO LIPID RICH PLAQUE: Primary | ICD-10-CM

## 2023-03-14 PROCEDURE — 99204 OFFICE O/P NEW MOD 45 MIN: CPT | Performed by: INTERNAL MEDICINE

## 2023-03-14 RX ORDER — CALCIUM CARBONATE/VITAMIN D3 500-10/5ML
400 LIQUID (ML) ORAL EVERY OTHER DAY
COMMUNITY

## 2023-03-14 RX ORDER — ATORVASTATIN CALCIUM 40 MG/1
40 TABLET, FILM COATED ORAL AT BEDTIME
Qty: 90 TABLET | Refills: 4 | Status: SHIPPED | OUTPATIENT
Start: 2023-03-14 | End: 2024-04-23

## 2023-03-14 RX ORDER — NITROGLYCERIN 0.4 MG/1
0.4 TABLET SUBLINGUAL EVERY 5 MIN PRN
Qty: 50 TABLET | Refills: 11 | Status: SHIPPED | OUTPATIENT
Start: 2023-03-14

## 2023-03-14 NOTE — LETTER
3/14/2023    Matt Walker MD  [address] [facility] [address]    RE: Viral Martinez       Dear Colleague,     I had the pleasure of seeing Viral Martinez in the [facility].    HEART CARE ENCOUNTER CONSULTATON NOTE      [facility]  [phone]      Assessment/Recommendations   Assessment/Plan:  1.  Coronary artery disease with four-vessel bypass surgery with a CT angiogram in 2019 identifying 3-4 vessels.  Clinically doing well.  He is however very active exerciser and has some occasional chest discomfort which does not sound predictable with exertion.  I did indicated it would be reasonable to consider follow-up stress testing at this point in time.  He was going to think on this further and get back in touch with me if he wishes to pursue.  In the interim he will continue with the current medication regimen although change the aspirin to 81 mg daily.    2.  Dyslipidemia.  Lab results from May 2022 finds a total cholesterol 127, triglycerides of 54, LDL 64 on 40 mg of atorvastatin.  Renal and liver tests were within normal limits.  He does have follow-up with his primary care physician.  His hemoglobin A1c May 2022 was 6.1.    Plan 1.  He will update me with whether he wishes to pursue an exercise stress echocardiogram.  2.  Renewal of sublingual nitroglycerin  3.  Follow-up in 1 year or sooner if specific issues were to arise.         History of Present Illness/Subjective    HPI: Viral Martinez is a 62 year old male who is seen in follow-up.  He continues to be very active and does not have any predictable chest discomfort with exertion.  He has not required nitroglycerin.  He does report some occasional twinges of chest discomfort which are infrequent.  He is status post bypass surgery x4 vessels in 2014.  This included LIMA to the LAD, vein graft to the diagonal, vein graft to an obtuse marginal and vein graft to the right PDA.  CT angiogram in 2019  demonstrated 3 of the 4 grafts to be patent.  The graft to diagonal branch was not located.  We have today discussed follow-up stress testing which he is considering.    Recent Echocardiogram Results:  Echocardiogram Exercise Stress  Order: [id number]   Status: Final result      Visible to patient: Yes (seen)      Next appt: 03/14/2023 at 09:50 AM in Cardiology (Jacoby Wood MD)      Dx: CAD (coronary artery disease)      1 Result Note  Details    Reading Physician Reading Date Result Priority   Martinez Dotson MD  [phone] 10/11/2017 Routine   Provider, Historical 10/11/2017      Narrative & Impression    The stress electrocardiogram is negative for inducible ischemic EKG changes.    The patient's exercise tolerance was normal.    Left ventricle ejection fraction is normal. The estimated left ventricular ejection fraction is 60%.    Stress echocardiogram is negative for inducible ischemia.    Appropriate heart rate and blood pressure response to exercise            Recent Coronary Angiogram Results:    Exam Date Exam Time Exam Date Exam Time Accession # Performing Department Results    9/26/19  9:51 AM 9/26/19 10:25 AM [id number] [facility] [place]        [id number]       PACS Images     Show images for CTA Angiogram coronary artery     Study Result    Narrative & Impression   Native coronary arteries:  1.  Left main has 60-70% stenosis.    2.  50% stenosis in proximal LAD, 60-70% stenosis in mid LAD.D1 appears minimal disease.  3.  Ostial LCX has 80-90% stenosis.The rest LCX and its branches are nonobstructive lesion.  4.  Mild disease in proximal to mid RCA.Distal RCA has 80-90% stenosis     Graft evaluation:  1.  LIMA to mid LAD is patent.  2.  SVG to OM1 is patent.  3.  SVG to PDA is patent.          Physical Examination  Review of Systems   Vitals: 118/64, weight 180 pounds, heart rate of 68 and regular  Wt Readings from Last 3 Encounters:   05/20/22 81.8 kg (180 lb 6.4  oz)   02/24/21 78.5 kg (173 lb)   02/09/21 79.8 kg (176 lb)       General Appearance:   no distress, normal body habitus   ENT/Mouth:  Wearing a facemask      EYES:  no scleral icterus, normal conjunctivae   Neck: no carotid bruits or thyromegaly   Chest/Lungs:   lungs are clear to auscultation, no rales or wheezing,  sternal scar, equal chest wall expansion    Cardiovascular:   Regular. Normal first and second heart sounds with systolic murmur rubs, or gallops; the carotid, radial and posterior tibial pulses are intact, Jugular venous pressure within normal limits, no significant edema bilaterally    Abdomen:  no  bruits, or tenderness; bowel sounds are present   Extremities: no cyanosis or clubbing   Skin: no xanthelasma, warm.    Neurologic: no tremors     Psychiatric: alert and oriented x3, calm        Please refer above for cardiac ROS details.        Medical History  Surgical History Family History Social History   Past Medical History:   Diagnosis Date     Coronary artery disease 2014     Family history of myocardial infarction 2006    brother age 62     GERD (gastroesophageal reflux disease)      High cholesterol 2014    dislipidemia     Past Surgical History:   Procedure Laterality Date     BYPASS GRAFT ARTERY CORONARY  2014    LAD diag OM, right posterior     CARDIAC CATHETERIZATION       ZZC CABG, VEIN, SINGLE      Description: CABG (CABG);  Recorded: 05/12/2014;     Family History   Problem Relation Age of Onset     No Known Problems Mother         89     No Known Problems Father         97     Cervical Cancer Sister      No Known Problems Sister      No Known Problems Sister      No Known Problems Sister      No Known Problems Sister      No Known Problems Sister      No Known Problems Sister      Coronary Artery Disease Brother      Coronary Artery Disease Brother      Coronary Artery Disease Brother      Prostate Cancer Brother      No Known Problems Brother      No Known Problems Daughter      No  Known Problems Son         Social History     Socioeconomic History     Marital status: Single     Spouse name: Not on file     Number of children: Not on file     Years of education: Not on file     Highest education level: Not on file   Occupational History     Not on file   Tobacco Use     Smoking status: Never     Smokeless tobacco: Never   Substance and Sexual Activity     Alcohol use: Not Currently     Comment: Alcoholic Drinks/day: 1988 stopped drinking     Drug use: Never     Sexual activity: Yes     Partners: Female   Other Topics Concern     Not on file   Social History Narrative    Lives with his partner, Ruba Cox (cardiac nurse).  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  Son Lambert and daughter Keli.     Social Determinants of Health     Financial Resource Strain: Not on file   Food Insecurity: Not on file   Transportation Needs: Not on file   Physical Activity: Not on file   Stress: Not on file   Social Connections: Not on file   Intimate Partner Violence: Not on file   Housing Stability: Not on file           Medications  Allergies   Current Outpatient Medications   Medication Sig Dispense Refill     Ascorbic Acid 500 MG CHEW Take 1 tablet by mouth every other day       aspirin 325 MG tablet [ASPIRIN 325 MG TABLET] Take 162 mg by mouth daily. Per patient 8/14/15       atorvastatin (LIPITOR) 40 MG tablet Take 1 tablet (40 mg) by mouth At Bedtime 90 tablet 3     coenzyme Q10 (CO Q-10) 200 mg capsule [COENZYME Q10 (CO Q-10) 200 MG CAPSULE] Take 200 mg by mouth daily.       Lactobacillus rhamnosus GG (CULTURELLE) 10-15 Billion cell capsule [LACTOBACILLUS RHAMNOSUS GG (CULTURELLE) 10-15 BILLION CELL CAPSULE] Take 1 capsule by mouth daily.       multivitamin (ONE-DAILY) tablet Take 1 tablet by mouth every other day       nitroglycerin (NITROSTAT) 0.4 MG SL tablet [NITROGLYCERIN (NITROSTAT) 0.4 MG SL TABLET] Place 1 tablet (0.4 mg total) under the tongue every 5 (five) minutes as needed for chest pain. 25 tablet 5     Omega-3  Fatty Acids (FISH OIL OMEGA-3) 1000 MG CAPS Take 1 capsule by mouth daily       psyllium husk (METAMUCIL ORAL) [PSYLLIUM HUSK (METAMUCIL ORAL)] Take by mouth.       triamcinolone (KENALOG) 0.1 % external ointment Apply topically 2 times daily 80 g 0     vitamin D3 (CHOLECALCIFEROL) 125 MCG (5000 UT) tablet Take 1 tablet (125 mcg) by mouth every other day       No Known Allergies       Lab Results    Chemistry/lipid CBC Cardiac Enzymes/BNP/TSH/INR   Recent Labs   Lab Test 05/20/22  1246   CHOL 127   HDL 52   LDL 64   TRIG 54     Recent Labs   Lab Test 05/20/22  1246 02/24/21  1128 02/20/20  1331   LDL 64 49 54     Recent Labs   Lab Test 05/20/22  1246      POTASSIUM 4.7   CHLORIDE 105   CO2 29   GLC 96   BUN 17   CR 0.94   GFRESTIMATED >90   ELAYNE 9.6     Recent Labs   Lab Test 05/20/22  1246 02/24/21  1128 02/20/20  1331   CR 0.94 1.00 1.00     Recent Labs   Lab Test 05/20/22  1246 02/24/21  1128   A1C 6.1* 5.9*          Recent Labs   Lab Test 05/20/22  1246   WBC 4.8   HGB 14.1   HCT 42.1   MCV 88        Recent Labs   Lab Test 05/20/22  1246 02/24/21  1128 02/20/20  1331   HGB 14.1 14.5 14.3    No results for input(s): TROPONINI in the last 72332 hours.  No results for input(s): BNP, NTBNPI, NTBNP in the last 77201 hours.  Recent Labs   Lab Test 02/20/20  1331   TSH 1.18     No results for input(s): INR in the last 67995 hours.     Jacoby Wood MD                Thank you for allowing me to participate in the care of your patient.      Sincerely,     Jacoby Wood MD     [facility]  cc:   No referring provider defined for this encounter.

## 2023-03-14 NOTE — PROGRESS NOTES
HEART CARE ENCOUNTER CONSULTATON NOTE      [facility]  [phone]      Assessment/Recommendations   Assessment/Plan:  1.  Coronary artery disease with four-vessel bypass surgery with a CT angiogram in 2019 identifying 3-4 vessels.  Clinically doing well.  He is however very active exerciser and has some occasional chest discomfort which does not sound predictable with exertion.  I did indicated it would be reasonable to consider follow-up stress testing at this point in time.  He was going to think on this further and get back in touch with me if he wishes to pursue.  In the interim he will continue with the current medication regimen although change the aspirin to 81 mg daily.    2.  Dyslipidemia.  Lab results from May 2022 finds a total cholesterol 127, triglycerides of 54, LDL 64 on 40 mg of atorvastatin.  Renal and liver tests were within normal limits.  He does have follow-up with his primary care physician.  His hemoglobin A1c May 2022 was 6.1.    Plan 1.  He will update me with whether he wishes to pursue an exercise stress echocardiogram.  2.  Renewal of sublingual nitroglycerin  3.  Follow-up in 1 year or sooner if specific issues were to arise.         History of Present Illness/Subjective    HPI: Viral Martinez is a 62 year old male who is seen in follow-up.  He continues to be very active and does not have any predictable chest discomfort with exertion.  He has not required nitroglycerin.  He does report some occasional twinges of chest discomfort which are infrequent.  He is status post bypass surgery x4 vessels in 2014.  This included LIMA to the LAD, vein graft to the diagonal, vein graft to an obtuse marginal and vein graft to the right PDA.  CT angiogram in 2019 demonstrated 3 of the 4 grafts to be patent.  The graft to diagonal branch was not located.  We have today discussed follow-up stress testing which he is considering.    Recent Echocardiogram Results:  Echocardiogram  Exercise Stress  Order: [id number]  Status: Final result     Visible to patient: Yes (seen)     Next appt: 03/14/2023 at 09:50 AM in Cardiology (Jacoby Wood MD)     Dx: CAD (coronary artery disease)     1 Result Note  Details    Reading Physician Reading Date Result Priority   Martinez Dotson MD  [phone] 10/11/2017 Routine   Provider, Historical 10/11/2017      Narrative & Impression    The stress electrocardiogram is negative for inducible ischemic EKG changes.    The patient's exercise tolerance was normal.    Left ventricle ejection fraction is normal. The estimated left ventricular ejection fraction is 60%.    Stress echocardiogram is negative for inducible ischemia.    Appropriate heart rate and blood pressure response to exercise            Recent Coronary Angiogram Results:    Exam Date Exam Time Exam Date Exam Time Accession # Performing Department Results    9/26/19  9:51 AM 9/26/19 10:25 AM [id number] [facility] [place]        [id number]       PACS Images     Show images for CTA Angiogram coronary artery     Study Result    Narrative & Impression   Native coronary arteries:  1.  Left main has 60-70% stenosis.    2.  50% stenosis in proximal LAD, 60-70% stenosis in mid LAD.D1 appears minimal disease.  3.  Ostial LCX has 80-90% stenosis.The rest LCX and its branches are nonobstructive lesion.  4.  Mild disease in proximal to mid RCA.Distal RCA has 80-90% stenosis     Graft evaluation:  1.  LIMA to mid LAD is patent.  2.  SVG to OM1 is patent.  3.  SVG to PDA is patent.          Physical Examination  Review of Systems   Vitals: 118/64, weight 180 pounds, heart rate of 68 and regular  Wt Readings from Last 3 Encounters:   05/20/22 81.8 kg (180 lb 6.4 oz)   02/24/21 78.5 kg (173 lb)   02/09/21 79.8 kg (176 lb)       General Appearance:   no distress, normal body habitus   ENT/Mouth:  Wearing a facemask      EYES:  no scleral icterus, normal conjunctivae   Neck: no  carotid bruits or thyromegaly   Chest/Lungs:   lungs are clear to auscultation, no rales or wheezing,  sternal scar, equal chest wall expansion    Cardiovascular:   Regular. Normal first and second heart sounds with systolic murmur rubs, or gallops; the carotid, radial and posterior tibial pulses are intact, Jugular venous pressure within normal limits, no significant edema bilaterally    Abdomen:  no  bruits, or tenderness; bowel sounds are present   Extremities: no cyanosis or clubbing   Skin: no xanthelasma, warm.    Neurologic: no tremors     Psychiatric: alert and oriented x3, calm        Please refer above for cardiac ROS details.        Medical History  Surgical History Family History Social History   Past Medical History:   Diagnosis Date     Coronary artery disease 2014     Family history of myocardial infarction 2006    brother age 62     GERD (gastroesophageal reflux disease)      High cholesterol 2014    dislipidemia     Past Surgical History:   Procedure Laterality Date     BYPASS GRAFT ARTERY CORONARY  2014    LAD diag OM, right posterior     CARDIAC CATHETERIZATION       ZZC CABG, VEIN, SINGLE      Description: CABG (CABG);  Recorded: 05/12/2014;     Family History   Problem Relation Age of Onset     No Known Problems Mother         89     No Known Problems Father         97     Cervical Cancer Sister      No Known Problems Sister      No Known Problems Sister      No Known Problems Sister      No Known Problems Sister      No Known Problems Sister      No Known Problems Sister      Coronary Artery Disease Brother      Coronary Artery Disease Brother      Coronary Artery Disease Brother      Prostate Cancer Brother      No Known Problems Brother      No Known Problems Daughter      No Known Problems Son         Social History     Socioeconomic History     Marital status: Single     Spouse name: Not on file     Number of children: Not on file     Years of education: Not on file     Highest education  level: Not on file   Occupational History     Not on file   Tobacco Use     Smoking status: Never     Smokeless tobacco: Never   Substance and Sexual Activity     Alcohol use: Not Currently     Comment: Alcoholic Drinks/day: 1988 stopped drinking     Drug use: Never     Sexual activity: Yes     Partners: Female   Other Topics Concern     Not on file   Social History Narrative    Lives with his partner, Ruba Cox (cardiac nurse).  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  Son Lambert and daughter Keli.     Social Determinants of Health     Financial Resource Strain: Not on file   Food Insecurity: Not on file   Transportation Needs: Not on file   Physical Activity: Not on file   Stress: Not on file   Social Connections: Not on file   Intimate Partner Violence: Not on file   Housing Stability: Not on file           Medications  Allergies   Current Outpatient Medications   Medication Sig Dispense Refill     Ascorbic Acid 500 MG CHEW Take 1 tablet by mouth every other day       aspirin 325 MG tablet [ASPIRIN 325 MG TABLET] Take 162 mg by mouth daily. Per patient 8/14/15       atorvastatin (LIPITOR) 40 MG tablet Take 1 tablet (40 mg) by mouth At Bedtime 90 tablet 3     coenzyme Q10 (CO Q-10) 200 mg capsule [COENZYME Q10 (CO Q-10) 200 MG CAPSULE] Take 200 mg by mouth daily.       Lactobacillus rhamnosus GG (CULTURELLE) 10-15 Billion cell capsule [LACTOBACILLUS RHAMNOSUS GG (CULTURELLE) 10-15 BILLION CELL CAPSULE] Take 1 capsule by mouth daily.       multivitamin (ONE-DAILY) tablet Take 1 tablet by mouth every other day       nitroglycerin (NITROSTAT) 0.4 MG SL tablet [NITROGLYCERIN (NITROSTAT) 0.4 MG SL TABLET] Place 1 tablet (0.4 mg total) under the tongue every 5 (five) minutes as needed for chest pain. 25 tablet 5     Omega-3 Fatty Acids (FISH OIL OMEGA-3) 1000 MG CAPS Take 1 capsule by mouth daily       psyllium husk (METAMUCIL ORAL) [PSYLLIUM HUSK (METAMUCIL ORAL)] Take by mouth.       triamcinolone (KENALOG) 0.1 % external ointment Apply  topically 2 times daily 80 g 0     vitamin D3 (CHOLECALCIFEROL) 125 MCG (5000 UT) tablet Take 1 tablet (125 mcg) by mouth every other day       No Known Allergies       Lab Results    Chemistry/lipid CBC Cardiac Enzymes/BNP/TSH/INR   Recent Labs   Lab Test 05/20/22  1246   CHOL 127   HDL 52   LDL 64   TRIG 54     Recent Labs   Lab Test 05/20/22  1246 02/24/21  1128 02/20/20  1331   LDL 64 49 54     Recent Labs   Lab Test 05/20/22  1246      POTASSIUM 4.7   CHLORIDE 105   CO2 29   GLC 96   BUN 17   CR 0.94   GFRESTIMATED >90   ELAYNE 9.6     Recent Labs   Lab Test 05/20/22  1246 02/24/21  1128 02/20/20  1331   CR 0.94 1.00 1.00     Recent Labs   Lab Test 05/20/22  1246 02/24/21  1128   A1C 6.1* 5.9*          Recent Labs   Lab Test 05/20/22  1246   WBC 4.8   HGB 14.1   HCT 42.1   MCV 88        Recent Labs   Lab Test 05/20/22  1246 02/24/21  1128 02/20/20  1331   HGB 14.1 14.5 14.3    No results for input(s): TROPONINI in the last 34355 hours.  No results for input(s): BNP, NTBNPI, NTBNP in the last 38666 hours.  Recent Labs   Lab Test 02/20/20  1331   TSH 1.18     No results for input(s): INR in the last 87549 hours.     Jacoby Wood MD

## 2023-03-14 NOTE — PATIENT INSTRUCTIONS
Please let me know about your thoughts about stress testing either ECG stress test or ultrasound stress test.Kylie is my nurse and her number is [phone].We discussed working on hydration.Ok to go to 81mg of aspirin.

## 2023-03-22 ENCOUNTER — TELEPHONE (OUTPATIENT)
Dept: CARDIOLOGY | Facility: CLINIC | Age: 63
End: 2023-03-22

## 2023-03-22 DIAGNOSIS — I25.10 CORONARY ARTERY DISEASE INVOLVING NATIVE CORONARY ARTERY OF NATIVE HEART WITHOUT ANGINA PECTORIS: ICD-10-CM

## 2023-03-22 DIAGNOSIS — I50.30 HEART FAILURE WITH PRESERVED EJECTION FRACTION, NYHA CLASS I (H): Primary | ICD-10-CM

## 2023-03-24 NOTE — TELEPHONE ENCOUNTER
Stress echo ordered.  -ra    ===View-only below this line===  ----- Message -----  From: Jacoby Wood MD  Sent: 3/23/2023   5:11 PM CDT  To: Kylie Orourke RN  Subject: RE:                                              Stress echo

## 2023-04-25 ENCOUNTER — PATIENT OUTREACH (OUTPATIENT)
Dept: CARE COORDINATION | Facility: CLINIC | Age: 63
End: 2023-04-25
Payer: COMMERCIAL

## 2023-05-09 ENCOUNTER — PATIENT OUTREACH (OUTPATIENT)
Dept: CARE COORDINATION | Facility: CLINIC | Age: 63
End: 2023-05-09
Payer: COMMERCIAL

## 2023-06-14 ENCOUNTER — HOSPITAL ENCOUNTER (OUTPATIENT)
Dept: CARDIOLOGY | Facility: HOSPITAL | Age: 63
Discharge: HOME | End: 2023-06-14
Attending: INTERNAL MEDICINE | Admitting: INTERNAL MEDICINE
Payer: COMMERCIAL

## 2023-06-14 DIAGNOSIS — I50.30 HEART FAILURE WITH PRESERVED EJECTION FRACTION, NYHA CLASS I (H): ICD-10-CM

## 2023-06-14 DIAGNOSIS — I25.10 CORONARY ARTERY DISEASE INVOLVING NATIVE CORONARY ARTERY OF NATIVE HEART WITHOUT ANGINA PECTORIS: ICD-10-CM

## 2023-06-14 PROCEDURE — 93321 DOPPLER ECHO F-UP/LMTD STD: CPT | Mod: 26 | Performed by: INTERNAL MEDICINE

## 2023-06-14 PROCEDURE — 93325 DOPPLER ECHO COLOR FLOW MAPG: CPT | Mod: 26 | Performed by: INTERNAL MEDICINE

## 2023-06-14 PROCEDURE — 93350 STRESS TTE ONLY: CPT | Mod: TC

## 2023-06-14 PROCEDURE — 93016 CV STRESS TEST SUPVJ ONLY: CPT | Performed by: INTERNAL MEDICINE

## 2023-06-14 PROCEDURE — 93325 DOPPLER ECHO COLOR FLOW MAPG: CPT | Mod: TC

## 2023-06-14 PROCEDURE — 93350 STRESS TTE ONLY: CPT | Mod: 26 | Performed by: INTERNAL MEDICINE

## 2023-06-14 PROCEDURE — 93018 CV STRESS TEST I&R ONLY: CPT | Performed by: INTERNAL MEDICINE

## 2023-08-06 ENCOUNTER — HEALTH MAINTENANCE LETTER (OUTPATIENT)
Age: 63
End: 2023-08-06

## 2024-01-10 ENCOUNTER — TELEPHONE (OUTPATIENT)
Dept: CARDIOLOGY | Facility: CLINIC | Age: 64
End: 2024-01-10
Payer: COMMERCIAL

## 2024-01-10 DIAGNOSIS — I25.10 CORONARY ARTERY DISEASE INVOLVING NATIVE CORONARY ARTERY OF NATIVE HEART WITHOUT ANGINA PECTORIS: Primary | ICD-10-CM

## 2024-01-10 DIAGNOSIS — R07.9 CHEST PAIN, UNSPECIFIED TYPE: ICD-10-CM

## 2024-01-10 DIAGNOSIS — R07.89 CHEST DISCOMFORT: Primary | ICD-10-CM

## 2024-01-10 DIAGNOSIS — I25.10 CORONARY ARTERY DISEASE INVOLVING NATIVE CORONARY ARTERY OF NATIVE HEART WITHOUT ANGINA PECTORIS: ICD-10-CM

## 2024-01-10 NOTE — TELEPHONE ENCOUNTER
Return call to Ruba, pt has been having episodes of a dull ache left of center in his chest, happening at rest.  Pt has been doing physical activity, but has not been physically warmed up prior, and he's been lifting wood.  Pt reports the dull ache a 2 out of 10.  He won't use the sublingual nitroglycerin, but did take an aspirin and the pain dissipated.    ----- Message -----  From: Lorie Davis  Sent: 1/10/2024   8:14 AM CST  To: Kylie Orourke RN    Patient said he has been having chest pains that have occurred over the years that are fairly brief and mild.  Over the past couple of weeks he has done some heavy lifting and it seems to be triggering a longer period of chest pain lasting one and sometimes two hours.

## 2024-01-10 NOTE — TELEPHONE ENCOUNTER
[facility] Call Center    Phone Message    May a detailed message be left on voicemail: yes     Reason for Call: Other: Ruba called requesting assistance to help get Viral set up with another cardiologist after Dr. Wood left given his afib symptoms are getting worse. Attempted to offer to schedule appt with next cardiologist but Ruba kept going on about trying to get Viral in as soon as possible and instead wanted the assistance of the clinic where Viral was seeing Dr. Wood to assist in getting him scheduled. Please reach out to Viral to schedule. Thank you!     Action Taken: Other: Cardiology    Travel Screening: Not Applicable    Thank you!  [facility]

## 2024-01-12 NOTE — TELEPHONE ENCOUNTER
LVM for pt stating recommendations, as requested stress test is not yet scheduling.  Added [facility] phone # to schedule, or writer's direct phone # to call with questions.  -ral

## 2024-01-17 ENCOUNTER — OFFICE VISIT (OUTPATIENT)
Dept: CARDIOLOGY | Facility: CLINIC | Age: 64
End: 2024-01-17
Attending: INTERNAL MEDICINE
Payer: COMMERCIAL

## 2024-01-17 VITALS
DIASTOLIC BLOOD PRESSURE: 72 MMHG | HEART RATE: 56 BPM | RESPIRATION RATE: 16 BRPM | OXYGEN SATURATION: 98 % | BODY MASS INDEX: 24.95 KG/M2 | SYSTOLIC BLOOD PRESSURE: 114 MMHG | WEIGHT: 184 LBS

## 2024-01-17 DIAGNOSIS — E78.5 HYPERLIPIDEMIA WITH TARGET LDL LESS THAN 70: ICD-10-CM

## 2024-01-17 DIAGNOSIS — R07.9 CHEST PAIN, UNSPECIFIED TYPE: Primary | ICD-10-CM

## 2024-01-17 DIAGNOSIS — I25.10 CORONARY ARTERY DISEASE INVOLVING NATIVE CORONARY ARTERY OF NATIVE HEART WITHOUT ANGINA PECTORIS: ICD-10-CM

## 2024-01-17 PROCEDURE — 99214 OFFICE O/P EST MOD 30 MIN: CPT | Performed by: INTERNAL MEDICINE

## 2024-01-17 PROCEDURE — G2211 COMPLEX E/M VISIT ADD ON: HCPCS | Performed by: INTERNAL MEDICINE

## 2024-01-17 RX ORDER — ASPIRIN 81 MG/1
81 TABLET, CHEWABLE ORAL DAILY
COMMUNITY
Start: 2024-01-17

## 2024-01-17 NOTE — LETTER
1/17/2024    Matt Walker MD  [address] [facility] [address]    RE: Viral Martinez       Dear Colleague,     I had the pleasure of seeing Viral Martinez in the [facility].    HEART CARE ENCOUNTER CONSULTATON NOTE      [facility]  [phone]      Assessment/Recommendations   Assessment:   Coronary artery disease status post coronary bypass surgery  2.   Hyperlipidemia  3.  Atypical chest pain    Plan:   Treadmill ECG stress test  2.  Continue aspirin 81 mg daily for coronary disease  3.  Continue atorvastatin 40 mg daily for hyperlipidemia, coronary disease  4.  Continue active lifestyle         History of Present Illness/Subjective    HPI: Viral Martinez is a 63 year old male history of coronary artery bypass surgery, multivessel coronary artery disease who presents to cardiology clinic to establish care with myself.    Patient was a former patient of Dr. Wood.   Patient's wife works in cardiology department.    The patient experienced 2 episodes of chest pain.  1 episode occurred while lifting heavy objects during his exercise routine and another was noted during lifting firewood pieces weighing up to 50 pounds.  He states he did not experience chest pain during the actual exertional events but approximately 1 to 2 hours later.  Pain was across the chest.  Appeared to improve with stretching out his chest wall.  He denies any chest pain with his regular activities such as exercising and cross-country skiing.    He is an avid cross-country ski year.    Did undergo stress testing in 2023.  This was completed by a stress echo which demonstrated no ECG changes.    Given ongoing episodes of chest pain recommend proceeding with treadmill ECG stress test given cardiovascular history.  He did not have the symptoms at the time of his last stress test.  He is compliant with all medications.  Lipids are well-controlled which were reviewed.           Medical History   Surgical History Family History Social History   Past Medical History:   Diagnosis Date    Coronary artery disease 2014    Family history of myocardial infarction 2006    brother age 62    GERD (gastroesophageal reflux disease)     High cholesterol 2014    dislipidemia     Past Surgical History:   Procedure Laterality Date    BYPASS GRAFT ARTERY CORONARY  2014    LAD diag OM, right posterior    CARDIAC CATHETERIZATION      ZZC CABG, VEIN, SINGLE      Description: CABG (CABG);  Recorded: 05/12/2014;     Family History   Problem Relation Age of Onset    No Known Problems Mother         89    No Known Problems Father         97    Cervical Cancer Sister     No Known Problems Sister     No Known Problems Sister     No Known Problems Sister     No Known Problems Sister     No Known Problems Sister     No Known Problems Sister     Coronary Artery Disease Brother     Coronary Artery Disease Brother     Coronary Artery Disease Brother     Prostate Cancer Brother     No Known Problems Brother     No Known Problems Daughter     No Known Problems Son         Social History     Socioeconomic History    Marital status: Single     Spouse name: Not on file    Number of children: Not on file    Years of education: Not on file    Highest education level: Not on file   Occupational History    Not on file   Tobacco Use    Smoking status: Never    Smokeless tobacco: Never   Substance and Sexual Activity    Alcohol use: Not Currently     Comment: Alcoholic Drinks/day: 1988 stopped drinking    Drug use: Never    Sexual activity: Yes     Partners: Female   Other Topics Concern    Not on file   Social History Narrative    Lives with his partner, Ruba Cox (cardiac nurse).  [employer].  Son Lambert and daughter Keli.     Social Determinants of Health     Financial Resource Strain: Not on file   Food Insecurity: Not on file   Transportation Needs: Not on file   Physical Activity: Not on file   Stress: Not on file   Social Connections:  Not on file   Interpersonal Safety: Not on file   Housing Stability: Not on file           Medications  Allergies   Current Outpatient Medications   Medication Sig Dispense Refill    Ascorbic Acid 500 MG CHEW Take 1 tablet by mouth every other day      aspirin (ASA) 81 MG chewable tablet Take 1 tablet (81 mg) by mouth daily      atorvastatin (LIPITOR) 40 MG tablet Take 1 tablet (40 mg) by mouth At Bedtime 90 tablet 4    coenzyme Q10 (CO Q-10) 200 mg capsule [COENZYME Q10 (CO Q-10) 200 MG CAPSULE] Take 200 mg by mouth daily.      Lactobacillus rhamnosus GG (CULTURELLE) 10-15 Billion cell capsule [LACTOBACILLUS RHAMNOSUS GG (CULTURELLE) 10-15 BILLION CELL CAPSULE] Take 1 capsule by mouth daily.      magnesium oxide 400 MG CAPS Take 400 mg by mouth every other day      multivitamin (ONE-DAILY) tablet Take 1 tablet by mouth every other day      nitroGLYcerin (NITROSTAT) 0.4 MG sublingual tablet Place 1 tablet (0.4 mg) under the tongue every 5 minutes as needed 50 tablet 11    Omega-3 Fatty Acids (FISH OIL OMEGA-3) 1000 MG CAPS Take 1 capsule by mouth daily      psyllium husk (METAMUCIL ORAL) [PSYLLIUM HUSK (METAMUCIL ORAL)] Take by mouth.      triamcinolone (KENALOG) 0.1 % external ointment Apply topically 2 times daily 80 g 0    vitamin D3 (CHOLECALCIFEROL) 125 MCG (5000 UT) tablet Take 1 tablet (125 mcg) by mouth every other day       No Known Allergies       Lab Results    Chemistry/lipid CBC Cardiac Enzymes/BNP/TSH/INR   Recent Labs   Lab Test 05/20/22  1246   CHOL 127   HDL 52   LDL 64   TRIG 54     Recent Labs   Lab Test 05/20/22  1246 02/24/21  1128 02/20/20  1331   LDL 64 49 54     Recent Labs   Lab Test 05/20/22  1246      POTASSIUM 4.7   CHLORIDE 105   CO2 29   GLC 96   BUN 17   CR 0.94   GFRESTIMATED >90   ELAYNE 9.6     Recent Labs   Lab Test 05/20/22  1246 02/24/21  1128 02/20/20  1331   CR 0.94 1.00 1.00     Recent Labs   Lab Test 05/20/22  1246 02/24/21  1128   A1C 6.1* 5.9*          Recent Labs   Lab  "Test 05/20/22  1246   WBC 4.8   HGB 14.1   HCT 42.1   MCV 88        Recent Labs   Lab Test 05/20/22  1246 02/24/21  1128 02/20/20  1331   HGB 14.1 14.5 14.3    No results for input(s): \"TROPONINI\" in the last 27364 hours.  No results for input(s): \"BNP\", \"NTBNPI\", \"NTBNP\" in the last 75485 hours.  Recent Labs   Lab Test 02/20/20  1331   TSH 1.18     No results for input(s): \"INR\" in the last 49004 hours.     Hermilo Green DO      Patient requires ongoing management of coronary artery disease, hyperlipidemia.  He will undergo stress testing and we will follow-up on testing results and determine further management.  If he has significant abnormalities on his stress test we will need further testing such as coronary angiogram.  If stress test is reassuring we will have ongoing management of his symptoms and continue to monitor and manage his coronary disease.      Thank you for allowing me to participate in the care of your patient.      Sincerely,     Hermilo Green DO     [facility]  cc:   Alan Dotson MD  [address], [address]  [place] [address]      "

## 2024-01-17 NOTE — PATIENT INSTRUCTIONS
Please contact direct nurses line Monday through Friday 8 AM to 5 PM @ [phone]    After-hours contact cardiology office at [phone].     Plan:   Treadmill ECG stress testing.

## 2024-01-18 ENCOUNTER — HOSPITAL ENCOUNTER (OUTPATIENT)
Dept: CARDIOLOGY | Facility: HOSPITAL | Age: 64
Discharge: HOME | End: 2024-01-18
Attending: INTERNAL MEDICINE | Admitting: INTERNAL MEDICINE
Payer: COMMERCIAL

## 2024-01-18 DIAGNOSIS — R07.9 CHEST PAIN, UNSPECIFIED TYPE: ICD-10-CM

## 2024-01-18 DIAGNOSIS — I25.10 CORONARY ARTERY DISEASE INVOLVING NATIVE CORONARY ARTERY OF NATIVE HEART WITHOUT ANGINA PECTORIS: ICD-10-CM

## 2024-01-18 LAB
CV STRESS CURRENT BP HE: NORMAL
CV STRESS CURRENT HR HE: 101
CV STRESS CURRENT HR HE: 110
CV STRESS CURRENT HR HE: 111
CV STRESS CURRENT HR HE: 114
CV STRESS CURRENT HR HE: 114
CV STRESS CURRENT HR HE: 115
CV STRESS CURRENT HR HE: 122
CV STRESS CURRENT HR HE: 127
CV STRESS CURRENT HR HE: 131
CV STRESS CURRENT HR HE: 133
CV STRESS CURRENT HR HE: 133
CV STRESS CURRENT HR HE: 134
CV STRESS CURRENT HR HE: 135
CV STRESS CURRENT HR HE: 143
CV STRESS CURRENT HR HE: 145
CV STRESS CURRENT HR HE: 145
CV STRESS CURRENT HR HE: 147
CV STRESS CURRENT HR HE: 147
CV STRESS CURRENT HR HE: 148
CV STRESS CURRENT HR HE: 72
CV STRESS CURRENT HR HE: 73
CV STRESS CURRENT HR HE: 79
CV STRESS CURRENT HR HE: 80
CV STRESS CURRENT HR HE: 80
CV STRESS CURRENT HR HE: 81
CV STRESS CURRENT HR HE: 82
CV STRESS CURRENT HR HE: 83
CV STRESS CURRENT HR HE: 83
CV STRESS CURRENT HR HE: 87
CV STRESS CURRENT HR HE: 89
CV STRESS CURRENT HR HE: 92
CV STRESS CURRENT HR HE: 94
CV STRESS CURRENT HR HE: 96
CV STRESS CURRENT HR HE: 96
CV STRESS DEVIATION TIME HE: NORMAL
CV STRESS ECHO PERCENT HR HE: NORMAL
CV STRESS EXERCISE STAGE HE: NORMAL
CV STRESS EXERCISE STAGE REACHED HE: NORMAL
CV STRESS FINAL RESTING BP HE: NORMAL
CV STRESS FINAL RESTING HR HE: 80
CV STRESS MAX HR HE: 148
CV STRESS MAX TREADMILL GRADE HE: 18
CV STRESS MAX TREADMILL SPEED HE: 5
CV STRESS PEAK DIA BP HE: NORMAL
CV STRESS PEAK SYS BP HE: NORMAL
CV STRESS PHASE HE: NORMAL
CV STRESS PROTOCOL HE: NORMAL
CV STRESS REASON STOPPED HE: NORMAL
CV STRESS RESTING PT POSITION HE: NORMAL
CV STRESS RESTING PT POSITION HE: NORMAL
CV STRESS ST DEVIATION AMOUNT HE: NORMAL
CV STRESS ST DEVIATION ELEVATION HE: NORMAL
CV STRESS ST EVELATION AMOUNT HE: NORMAL
CV STRESS SYMPTOMS HE: NORMAL
CV STRESS TEST TYPE HE: NORMAL
CV STRESS TOTAL STAGE TIME MIN 1 HE: NORMAL
STRESS ECHO BASELINE DIASTOLIC HE: 81
STRESS ECHO BASELINE HR: 71
STRESS ECHO BASELINE SYSTOLIC BP: 135
STRESS ECHO LAST STRESS DIASTOLIC BP: 88
STRESS ECHO LAST STRESS HR: 148
STRESS ECHO LAST STRESS SYSTOLIC BP: 160
STRESS ECHO POST ESTIMATED WORKLOAD: 14.9
STRESS ECHO POST EXERCISE DUR MIN: 14
STRESS ECHO POST EXERCISE DUR SEC: 40
STRESS ECHO TARGET HR: 133

## 2024-01-18 PROCEDURE — 93017 CV STRESS TEST TRACING ONLY: CPT

## 2024-01-18 PROCEDURE — 93016 CV STRESS TEST SUPVJ ONLY: CPT | Performed by: INTERNAL MEDICINE

## 2024-01-18 PROCEDURE — 93018 CV STRESS TEST I&R ONLY: CPT | Performed by: INTERNAL MEDICINE

## 2024-01-18 NOTE — PROGRESS NOTES
HEART CARE ENCOUNTER CONSULTATON NOTE      [facility]  [phone]      Assessment/Recommendations   Assessment:   Coronary artery disease status post coronary bypass surgery  2.   Hyperlipidemia  3.  Atypical chest pain    Plan:   Treadmill ECG stress test  2.  Continue aspirin 81 mg daily for coronary disease  3.  Continue atorvastatin 40 mg daily for hyperlipidemia, coronary disease  4.  Continue active lifestyle         History of Present Illness/Subjective    HPI: Viral Martinez is a 63 year old male history of coronary artery bypass surgery, multivessel coronary artery disease who presents to cardiology clinic to establish care with myself.    Patient was a former patient of Dr. Wood.   Patient's wife works in cardiology department.    The patient experienced 2 episodes of chest pain.  1 episode occurred while lifting heavy objects during his exercise routine and another was noted during lifting firewood pieces weighing up to 50 pounds.  He states he did not experience chest pain during the actual exertional events but approximately 1 to 2 hours later.  Pain was across the chest.  Appeared to improve with stretching out his chest wall.  He denies any chest pain with his regular activities such as exercising and cross-country skiing.    He is an avid cross-country ski year.    Did undergo stress testing in 2023.  This was completed by a stress echo which demonstrated no ECG changes.    Given ongoing episodes of chest pain recommend proceeding with treadmill ECG stress test given cardiovascular history.  He did not have the symptoms at the time of his last stress test.  He is compliant with all medications.  Lipids are well-controlled which were reviewed.           Medical History  Surgical History Family History Social History   Past Medical History:   Diagnosis Date    Coronary artery disease 2014    Family history of myocardial infarction 2006    brother age 62    GERD  (gastroesophageal reflux disease)     High cholesterol 2014    dislipidemia     Past Surgical History:   Procedure Laterality Date    BYPASS GRAFT ARTERY CORONARY  2014    LAD diag OM, right posterior    CARDIAC CATHETERIZATION      ZZC CABG, VEIN, SINGLE      Description: CABG (CABG);  Recorded: 05/12/2014;     Family History   Problem Relation Age of Onset    No Known Problems Mother         89    No Known Problems Father         97    Cervical Cancer Sister     No Known Problems Sister     No Known Problems Sister     No Known Problems Sister     No Known Problems Sister     No Known Problems Sister     No Known Problems Sister     Coronary Artery Disease Brother     Coronary Artery Disease Brother     Coronary Artery Disease Brother     Prostate Cancer Brother     No Known Problems Brother     No Known Problems Daughter     No Known Problems Son         Social History     Socioeconomic History    Marital status: Single     Spouse name: Not on file    Number of children: Not on file    Years of education: Not on file    Highest education level: Not on file   Occupational History    Not on file   Tobacco Use    Smoking status: Never    Smokeless tobacco: Never   Substance and Sexual Activity    Alcohol use: Not Currently     Comment: Alcoholic Drinks/day: 1988 stopped drinking    Drug use: Never    Sexual activity: Yes     Partners: Female   Other Topics Concern    Not on file   Social History Narrative    Lives with his partner, Rubarajendra Cox (cardiac nurse).  [facility].  Son Lambert and daughter Keli.     Social Determinants of Health     Financial Resource Strain: Not on file   Food Insecurity: Not on file   Transportation Needs: Not on file   Physical Activity: Not on file   Stress: Not on file   Social Connections: Not on file   Interpersonal Safety: Not on file   Housing Stability: Not on file           Medications  Allergies   Current Outpatient Medications   Medication Sig Dispense Refill    Ascorbic  Acid 500 MG CHEW Take 1 tablet by mouth every other day      aspirin (ASA) 81 MG chewable tablet Take 1 tablet (81 mg) by mouth daily      atorvastatin (LIPITOR) 40 MG tablet Take 1 tablet (40 mg) by mouth At Bedtime 90 tablet 4    coenzyme Q10 (CO Q-10) 200 mg capsule [COENZYME Q10 (CO Q-10) 200 MG CAPSULE] Take 200 mg by mouth daily.      Lactobacillus rhamnosus GG (CULTURELLE) 10-15 Billion cell capsule [LACTOBACILLUS RHAMNOSUS GG (CULTURELLE) 10-15 BILLION CELL CAPSULE] Take 1 capsule by mouth daily.      magnesium oxide 400 MG CAPS Take 400 mg by mouth every other day      multivitamin (ONE-DAILY) tablet Take 1 tablet by mouth every other day      nitroGLYcerin (NITROSTAT) 0.4 MG sublingual tablet Place 1 tablet (0.4 mg) under the tongue every 5 minutes as needed 50 tablet 11    Omega-3 Fatty Acids (FISH OIL OMEGA-3) 1000 MG CAPS Take 1 capsule by mouth daily      psyllium husk (METAMUCIL ORAL) [PSYLLIUM HUSK (METAMUCIL ORAL)] Take by mouth.      triamcinolone (KENALOG) 0.1 % external ointment Apply topically 2 times daily 80 g 0    vitamin D3 (CHOLECALCIFEROL) 125 MCG (5000 UT) tablet Take 1 tablet (125 mcg) by mouth every other day       No Known Allergies       Lab Results    Chemistry/lipid CBC Cardiac Enzymes/BNP/TSH/INR   Recent Labs   Lab Test 05/20/22  1246   CHOL 127   HDL 52   LDL 64   TRIG 54     Recent Labs   Lab Test 05/20/22  1246 02/24/21  1128 02/20/20  1331   LDL 64 49 54     Recent Labs   Lab Test 05/20/22  1246      POTASSIUM 4.7   CHLORIDE 105   CO2 29   GLC 96   BUN 17   CR 0.94   GFRESTIMATED >90   ELAYNE 9.6     Recent Labs   Lab Test 05/20/22  1246 02/24/21  1128 02/20/20  1331   CR 0.94 1.00 1.00     Recent Labs   Lab Test 05/20/22  1246 02/24/21  1128   A1C 6.1* 5.9*          Recent Labs   Lab Test 05/20/22  1246   WBC 4.8   HGB 14.1   HCT 42.1   MCV 88        Recent Labs   Lab Test 05/20/22  1246 02/24/21  1128 02/20/20  1331   HGB 14.1 14.5 14.3    No results for input(s):  "\"TROPONINI\" in the last 25363 hours.  No results for input(s): \"BNP\", \"NTBNPI\", \"NTBNP\" in the last 73959 hours.  Recent Labs   Lab Test 02/20/20  1331   TSH 1.18     No results for input(s): \"INR\" in the last 34633 hours.     Hermilo Green DO      Patient requires ongoing management of coronary artery disease, hyperlipidemia.  He will undergo stress testing and we will follow-up on testing results and determine further management.  If he has significant abnormalities on his stress test we will need further testing such as coronary angiogram.  If stress test is reassuring we will have ongoing management of his symptoms and continue to monitor and manage his coronary disease.                            "

## 2024-02-16 ENCOUNTER — OFFICE VISIT (OUTPATIENT)
Dept: INTERNAL MEDICINE | Facility: CLINIC | Age: 64
End: 2024-02-16
Payer: COMMERCIAL

## 2024-02-16 VITALS
SYSTOLIC BLOOD PRESSURE: 117 MMHG | DIASTOLIC BLOOD PRESSURE: 70 MMHG | BODY MASS INDEX: 24.89 KG/M2 | TEMPERATURE: 97.7 F | RESPIRATION RATE: 16 BRPM | HEART RATE: 63 BPM | OXYGEN SATURATION: 99 % | HEIGHT: 72 IN | WEIGHT: 183.8 LBS

## 2024-02-16 DIAGNOSIS — I25.10 CORONARY ARTERY DISEASE INVOLVING NATIVE CORONARY ARTERY OF NATIVE HEART WITHOUT ANGINA PECTORIS: ICD-10-CM

## 2024-02-16 DIAGNOSIS — E78.5 HYPERLIPIDEMIA WITH TARGET LDL LESS THAN 70: ICD-10-CM

## 2024-02-16 DIAGNOSIS — Z00.00 ANNUAL PHYSICAL EXAM: Primary | ICD-10-CM

## 2024-02-16 DIAGNOSIS — Z12.5 SCREENING FOR PROSTATE CANCER: ICD-10-CM

## 2024-02-16 DIAGNOSIS — R73.9 HYPERGLYCEMIA: ICD-10-CM

## 2024-02-16 DIAGNOSIS — I50.30 HEART FAILURE WITH PRESERVED EJECTION FRACTION, NYHA CLASS I (H): ICD-10-CM

## 2024-02-16 LAB
ALBUMIN SERPL BCG-MCNC: 4.7 G/DL (ref 3.5–5.2)
ALBUMIN UR-MCNC: NEGATIVE MG/DL
ALP SERPL-CCNC: 58 U/L (ref 40–150)
ALT SERPL W P-5'-P-CCNC: 32 U/L (ref 0–70)
ANION GAP SERPL CALCULATED.3IONS-SCNC: 9 MMOL/L (ref 7–15)
APPEARANCE UR: CLEAR
AST SERPL W P-5'-P-CCNC: 31 U/L (ref 0–45)
BILIRUB SERPL-MCNC: 0.6 MG/DL
BILIRUB UR QL STRIP: NEGATIVE
BUN SERPL-MCNC: 14.3 MG/DL (ref 8–23)
CALCIUM SERPL-MCNC: 9.5 MG/DL (ref 8.8–10.2)
CHLORIDE SERPL-SCNC: 104 MMOL/L (ref 98–107)
CHOLEST SERPL-MCNC: 139 MG/DL
COLOR UR AUTO: YELLOW
CREAT SERPL-MCNC: 1.01 MG/DL (ref 0.67–1.17)
CREAT UR-MCNC: 30.3 MG/DL
DEPRECATED HCO3 PLAS-SCNC: 27 MMOL/L (ref 22–29)
EGFRCR SERPLBLD CKD-EPI 2021: 83 ML/MIN/1.73M2
ERYTHROCYTE [DISTWIDTH] IN BLOOD BY AUTOMATED COUNT: 13.1 % (ref 10–15)
FASTING STATUS PATIENT QL REPORTED: NORMAL
GLUCOSE SERPL-MCNC: 107 MG/DL (ref 70–99)
GLUCOSE UR STRIP-MCNC: NEGATIVE MG/DL
HBA1C MFR BLD: 6 % (ref 0–5.6)
HCT VFR BLD AUTO: 44.7 % (ref 40–53)
HDLC SERPL-MCNC: 59 MG/DL
HGB BLD-MCNC: 14.7 G/DL (ref 13.3–17.7)
HGB UR QL STRIP: NEGATIVE
KETONES UR STRIP-MCNC: NEGATIVE MG/DL
LDLC SERPL CALC-MCNC: 67 MG/DL
LEUKOCYTE ESTERASE UR QL STRIP: NEGATIVE
MCH RBC QN AUTO: 29.5 PG (ref 26.5–33)
MCHC RBC AUTO-ENTMCNC: 32.9 G/DL (ref 31.5–36.5)
MCV RBC AUTO: 90 FL (ref 78–100)
MICROALBUMIN UR-MCNC: <12 MG/L
MICROALBUMIN/CREAT UR: NORMAL MG/G{CREAT}
NITRATE UR QL: NEGATIVE
NONHDLC SERPL-MCNC: 80 MG/DL
PH UR STRIP: 7 [PH] (ref 5–8)
PLATELET # BLD AUTO: 182 10E3/UL (ref 150–450)
POTASSIUM SERPL-SCNC: 4.2 MMOL/L (ref 3.4–5.3)
PROT SERPL-MCNC: 7.1 G/DL (ref 6.4–8.3)
PSA SERPL DL<=0.01 NG/ML-MCNC: 1.2 NG/ML (ref 0–4.5)
RBC # BLD AUTO: 4.99 10E6/UL (ref 4.4–5.9)
SODIUM SERPL-SCNC: 140 MMOL/L (ref 135–145)
SP GR UR STRIP: 1.01 (ref 1–1.03)
TRIGL SERPL-MCNC: 65 MG/DL
UROBILINOGEN UR STRIP-ACNC: 0.2 E.U./DL
WBC # BLD AUTO: 4.7 10E3/UL (ref 4–11)

## 2024-02-16 PROCEDURE — 80061 LIPID PANEL: CPT | Performed by: INTERNAL MEDICINE

## 2024-02-16 PROCEDURE — 80053 COMPREHEN METABOLIC PANEL: CPT | Performed by: INTERNAL MEDICINE

## 2024-02-16 PROCEDURE — 82043 UR ALBUMIN QUANTITATIVE: CPT | Performed by: INTERNAL MEDICINE

## 2024-02-16 PROCEDURE — 99214 OFFICE O/P EST MOD 30 MIN: CPT | Mod: 25 | Performed by: INTERNAL MEDICINE

## 2024-02-16 PROCEDURE — 83036 HEMOGLOBIN GLYCOSYLATED A1C: CPT | Performed by: INTERNAL MEDICINE

## 2024-02-16 PROCEDURE — 81003 URINALYSIS AUTO W/O SCOPE: CPT | Performed by: INTERNAL MEDICINE

## 2024-02-16 PROCEDURE — 99396 PREV VISIT EST AGE 40-64: CPT | Performed by: INTERNAL MEDICINE

## 2024-02-16 PROCEDURE — 36415 COLL VENOUS BLD VENIPUNCTURE: CPT | Performed by: INTERNAL MEDICINE

## 2024-02-16 PROCEDURE — 82570 ASSAY OF URINE CREATININE: CPT | Performed by: INTERNAL MEDICINE

## 2024-02-16 PROCEDURE — 85027 COMPLETE CBC AUTOMATED: CPT | Performed by: INTERNAL MEDICINE

## 2024-02-16 PROCEDURE — G0103 PSA SCREENING: HCPCS | Performed by: INTERNAL MEDICINE

## 2024-02-16 ASSESSMENT — PAIN SCALES - GENERAL: PAINLEVEL: NO PAIN (0)

## 2024-02-16 NOTE — PROGRESS NOTES
Office Visit - Physical   Viral Martinez   63 year old  male    Date of visit: 2/16/2024  Physician: Matt Walker MD     Assessment and Plan   1. Annual physical exam  This is a 63-year-old man with issues as discussed below.  He is up-to-date on his colonoscopy.  He defers immunizations.    2. Screening for prostate cancer  - Prostate Specific Antigen Screen; Future  - Prostate Specific Antigen Screen    3. Hyperglycemia  - Hemoglobin A1c; Future  - Hemoglobin A1c    4. Coronary artery disease involving native coronary artery of native heart without angina pectoris  Continue secondary prevention  - Lipid panel reflex to direct LDL Non-fasting; Future  - Lipid panel reflex to direct LDL Non-fasting    5. Hyperlipidemia with target LDL less than 70  Continue statin  - CBC with Platelets; Future  - Comprehensive metabolic panel; Future  - Lipid panel reflex to direct LDL Fasting; Future  - Albumin Random Urine Quantitative with Creat Ratio; Future  - UA Macroscopic with reflex to Microscopic and Culture; Future  - CBC with Platelets  - Comprehensive metabolic panel  - Albumin Random Urine Quantitative with Creat Ratio  - UA Macroscopic with reflex to Microscopic and Culture    6. Heart failure with preserved ejection fraction, NYHA class I (H)  We reviewed his echocardiograms and stress test.  Does not show a lot of diastolic dysfunction and he is asymptomatic.  We did review that he does have some slight hyperglycemia and a medication that could potentially be used both for its cardioprotective features as well as hypoglycemic effects would be Jardiance.  He is going to look into insurance coverage.    Return in about 1 year (around 2/16/2025) for Routine preventive.     Chief Complaint   Chief Complaint   Patient presents with    Physical    Recheck Medication     Pt reports that he is here for his physical.        Patient Profile   Social History     Social History Narrative    Lives with his partner, Ruba  Brooke (cardiac nurse).  [facility].  Son Lambert and daughter Keli.        Past Medical History   Patient Active Problem List   Diagnosis    Heart failure with preserved ejection fraction, NYHA class I (H)    CAD, s/p CABG 2014    Hyperlipidemia with target LDL less than 70       Past Surgical History  He has a past surgical history that includes CABG, VEIN, SINGLE; Cardiac catheterization; and Bypass graft artery coronary (2014).     History of Present Illness   This 63 year old comes in for annual physical and follow-up.  Overall he is doing well.  He recently had a stress test which we reviewed and looked okay.  He had some chest pain when he was doing some heavy lifting.  He exercises regularly including cross-country skiing and biking and has no exertional chest pain or dyspnea.  He has a history of coronary heart disease with coronary artery bypass grafting.  At one point it was suggested he has some diastolic heart dysfunction although his echocardiograms have looked pretty good.  He is on atorvastatin and aspirin and tolerating these.  He has had slightly elevated blood sugars.    Review of Systems: A comprehensive review of systems was negative except as noted.     Medications and Allergies   Current Outpatient Medications   Medication Sig Dispense Refill    Ascorbic Acid 500 MG CHEW Take 1 tablet by mouth every other day      aspirin (ASA) 81 MG chewable tablet Take 1 tablet (81 mg) by mouth daily      atorvastatin (LIPITOR) 40 MG tablet Take 1 tablet (40 mg) by mouth At Bedtime 90 tablet 4    coenzyme Q10 (CO Q-10) 200 mg capsule [COENZYME Q10 (CO Q-10) 200 MG CAPSULE] Take 200 mg by mouth daily.      Lactobacillus rhamnosus GG (CULTURELLE) 10-15 Billion cell capsule [LACTOBACILLUS RHAMNOSUS GG (CULTURELLE) 10-15 BILLION CELL CAPSULE] Take 1 capsule by mouth daily.      magnesium oxide 400 MG CAPS Take 400 mg by mouth every other day      multivitamin (ONE-DAILY) tablet Take 1 tablet by mouth every  other day      nitroGLYcerin (NITROSTAT) 0.4 MG sublingual tablet Place 1 tablet (0.4 mg) under the tongue every 5 minutes as needed 50 tablet 11    Omega-3 Fatty Acids (FISH OIL OMEGA-3) 1000 MG CAPS Take 1 capsule by mouth daily      psyllium husk (METAMUCIL ORAL) [PSYLLIUM HUSK (METAMUCIL ORAL)] Take by mouth.      triamcinolone (KENALOG) 0.1 % external ointment Apply topically 2 times daily 80 g 0    vitamin D3 (CHOLECALCIFEROL) 125 MCG (5000 UT) tablet Take 1 tablet (125 mcg) by mouth every other day       No Known Allergies     Family and Social History   Family History   Problem Relation Age of Onset    No Known Problems Mother         89    No Known Problems Father         97    Cervical Cancer Sister     No Known Problems Sister     No Known Problems Sister     No Known Problems Sister     No Known Problems Sister     No Known Problems Sister     No Known Problems Sister     Coronary Artery Disease Brother     Coronary Artery Disease Brother     Coronary Artery Disease Brother     Prostate Cancer Brother     No Known Problems Brother     No Known Problems Daughter     No Known Problems Son         Social History     Tobacco Use    Smoking status: Never    Smokeless tobacco: Never   Substance Use Topics    Alcohol use: Not Currently     Comment: Alcoholic Drinks/day: 1988 stopped drinking    Drug use: Never        Physical Exam   General Appearance:   No acute distress    /70 (BP Location: Left arm, Patient Position: Sitting, Cuff Size: Adult Regular)   Pulse 63   Temp 97.7  F (36.5  C) (Tympanic)   Resp 16   Ht 1.829 m (6')   Wt 83.4 kg (183 lb 12.8 oz)   SpO2 99%   BMI 24.93 kg/m      EYES: Eyelids, conjunctiva, and sclera were normal. Pupils were normal. Cornea, iris, and lens were normal bilaterally.  HEAD, EARS, NOSE, MOUTH, AND THROAT: Head and face were normal. Hearing was normal to voice and the ears were normal to external exam. Nose appearance was normal and there was no discharge.    NECK: Neck appearance was normal.   RESPIRATORY: Breathing pattern was normal and the chest moved symmetrically.  Percussion/auscultatory percussion was normal.  Lung sounds were normal and there were no abnormal sounds.  CARDIOVASCULAR: Heart rate and rhythm were normal.  S1 and S2 were normal and there were no extra sounds or murmurs. Peripheral pulses in arms and legs were normal.  Jugular venous pressure was normal.  There was no peripheral edema.  GASTROINTESTINAL: The abdomen was normal in contour.  NEUROLOGIC: The patient was alert and oriented to person, place, time, and circumstance. Speech was normal. Cranial nerves were normal. Motor strength was normal for age. The patient was normally coordinated.  PSYCHIATRIC:  Mood and affect were normal and the patient had normal recent and remote memory. The patient's judgment and insight were normal.       Additional Information        Matt Walker MD  Internal Medicine  Contact me at [phone]

## 2024-04-22 DIAGNOSIS — I25.10 CORONARY ARTERY DISEASE INVOLVING NATIVE CORONARY ARTERY OF NATIVE HEART WITHOUT ANGINA PECTORIS: ICD-10-CM

## 2024-04-23 RX ORDER — ATORVASTATIN CALCIUM 40 MG/1
40 TABLET, FILM COATED ORAL AT BEDTIME
Qty: 90 TABLET | Refills: 0 | Status: SHIPPED | OUTPATIENT
Start: 2024-04-23 | End: 2024-07-08

## 2024-07-08 ENCOUNTER — MYC REFILL (OUTPATIENT)
Dept: CARDIOLOGY | Facility: CLINIC | Age: 64
End: 2024-07-08
Payer: COMMERCIAL

## 2024-07-08 DIAGNOSIS — I25.10 CORONARY ARTERY DISEASE INVOLVING NATIVE CORONARY ARTERY OF NATIVE HEART WITHOUT ANGINA PECTORIS: ICD-10-CM

## 2024-07-08 RX ORDER — ATORVASTATIN CALCIUM 40 MG/1
40 TABLET, FILM COATED ORAL AT BEDTIME
Qty: 90 TABLET | Refills: 0 | Status: SHIPPED | OUTPATIENT
Start: 2024-07-08

## 2024-10-17 DIAGNOSIS — I25.10 CORONARY ARTERY DISEASE INVOLVING NATIVE CORONARY ARTERY OF NATIVE HEART WITHOUT ANGINA PECTORIS: ICD-10-CM

## 2024-10-22 RX ORDER — ATORVASTATIN CALCIUM 40 MG/1
40 TABLET, FILM COATED ORAL AT BEDTIME
Qty: 90 TABLET | Refills: 1 | Status: SHIPPED | OUTPATIENT
Start: 2024-10-22

## 2025-02-01 ENCOUNTER — PATIENT OUTREACH (OUTPATIENT)
Dept: CARE COORDINATION | Facility: CLINIC | Age: 65
End: 2025-02-01
Payer: MEDICARE

## 2025-04-06 ENCOUNTER — HEALTH MAINTENANCE LETTER (OUTPATIENT)
Age: 65
End: 2025-04-06

## 2025-04-28 DIAGNOSIS — I25.10 CORONARY ARTERY DISEASE INVOLVING NATIVE CORONARY ARTERY OF NATIVE HEART WITHOUT ANGINA PECTORIS: ICD-10-CM

## 2025-05-01 RX ORDER — ATORVASTATIN CALCIUM 40 MG/1
40 TABLET, FILM COATED ORAL AT BEDTIME
Qty: 90 TABLET | Refills: 0 | Status: SHIPPED | OUTPATIENT
Start: 2025-05-01

## 2025-05-07 ENCOUNTER — OFFICE VISIT (OUTPATIENT)
Dept: CARDIOLOGY | Facility: CLINIC | Age: 65
End: 2025-05-07
Payer: MEDICARE

## 2025-05-07 VITALS
DIASTOLIC BLOOD PRESSURE: 68 MMHG | HEART RATE: 61 BPM | BODY MASS INDEX: 24.41 KG/M2 | WEIGHT: 180 LBS | SYSTOLIC BLOOD PRESSURE: 106 MMHG

## 2025-05-07 DIAGNOSIS — R00.2 PALPITATIONS: ICD-10-CM

## 2025-05-07 DIAGNOSIS — R07.89 ATYPICAL CHEST PAIN: ICD-10-CM

## 2025-05-07 DIAGNOSIS — E78.2 MIXED HYPERLIPIDEMIA: ICD-10-CM

## 2025-05-07 DIAGNOSIS — I25.10 CORONARY ARTERY DISEASE INVOLVING NATIVE CORONARY ARTERY OF NATIVE HEART WITHOUT ANGINA PECTORIS: Primary | ICD-10-CM

## 2025-05-07 PROCEDURE — 99214 OFFICE O/P EST MOD 30 MIN: CPT | Performed by: INTERNAL MEDICINE

## 2025-05-07 PROCEDURE — 3078F DIAST BP <80 MM HG: CPT | Performed by: INTERNAL MEDICINE

## 2025-05-07 PROCEDURE — 3074F SYST BP LT 130 MM HG: CPT | Performed by: INTERNAL MEDICINE

## 2025-05-07 NOTE — PROGRESS NOTES
HEART CARE ENCOUNTER CONSULTATON NOTE      [facility]  [phone]      Assessment/Recommendations   Assessment:   Coronary artery disease status post coronary bypass surgery  2.   Hyperlipidemia, goal LDL less than 70, LDL 67.  3.  High endurance athlete  4.  Palpitations    Plan:   Patient has noticed some palpitations after extensive exercise which could be secondary to Actilite depletion.  He has no palpitations during exercise.  Denies any lightheadedness or syncope.  These were worse during his COVID infection  2.  Continue aspirin 81 mg daily for coronary disease  3.  Continue atorvastatin 40 mg daily for hyperlipidemia, coronary disease  4.  Continue active lifestyle.   5.  Increase hydration with electrolyte solutions when doing endurance training  6.  If worsening or persistent palpitations would consider Zio patch monitor    Patient wife, Ruba was present with patient today.     History of Present Illness/Subjective    HPI: Viral Martinez is a 64 year old male history of coronary artery bypass surgery, multivessel coronary artery disease who presents to cardiology clinic in follow-up.      Currently the patient denies any active anginal chest pain.  He remains a very avid endurance athlete.  Currently he is training for a 60 mile bike ride.      He does occasionally get a sensation of palpitations after prolonged exercise.  This could be related to electrolyte depletion.  He notices that after biking for 60 miles he started feeling some mild irregular beats sometime after he stopped exercising.  He denies any lightheadedness or syncope.  No active chest pressure.  Chronic chest discomfort when doing rapid exertional events which he says if he rubs his chest that resolves.  He has noticed no change in his ability to exert himself.      In the past he had dyspnea when he was first diagnosed with coronary disease.     Stress test performed last year was very low risk due to the  patient ability to exercise at a very high level with no EKG changes or chest pain symptoms.    Lipids are controlled         Medical History  Surgical History Family History Social History   Past Medical History:   Diagnosis Date    Coronary artery disease 2014    Family history of myocardial infarction 2006    brother age 62    GERD (gastroesophageal reflux disease)     High cholesterol 2014    dislipidemia     Past Surgical History:   Procedure Laterality Date    BYPASS GRAFT ARTERY CORONARY  2014    LAD diag OM, right posterior    CARDIAC CATHETERIZATION      ZZC CABG, VEIN, SINGLE      Description: CABG (CABG);  Recorded: 05/12/2014;     Family History   Problem Relation Age of Onset    No Known Problems Mother         89    No Known Problems Father         97    Cervical Cancer Sister     No Known Problems Sister     No Known Problems Sister     No Known Problems Sister     No Known Problems Sister     No Known Problems Sister     No Known Problems Sister     Coronary Artery Disease Brother     Coronary Artery Disease Brother     Coronary Artery Disease Brother     Prostate Cancer Brother     No Known Problems Brother     No Known Problems Daughter     No Known Problems Son         Social History     Socioeconomic History    Marital status: Single     Spouse name: Not on file    Number of children: Not on file    Years of education: Not on file    Highest education level: Not on file   Occupational History    Not on file   Tobacco Use    Smoking status: Never    Smokeless tobacco: Never   Substance and Sexual Activity    Alcohol use: Not Currently     Comment: Alcoholic Drinks/day: 1988 stopped drinking    Drug use: Never    Sexual activity: Yes     Partners: Female   Other Topics Concern    Not on file   Social History Narrative    Lives with his partner, Ruba Cox (cardiac nurse).  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  Son Lambert and daughter Keli.     Social Drivers of Health     Financial Resource Strain: Not on file   Food  Insecurity: Not on file   Transportation Needs: Not on file   Physical Activity: Not on file   Stress: Not on file   Social Connections: Not on file   Interpersonal Safety: Low Risk  (2/16/2024)    Interpersonal Safety     Do you feel physically and emotionally safe where you currently live?: Yes     Within the past 12 months, have you been hit, slapped, kicked or otherwise physically hurt by someone?: No     Within the past 12 months, have you been humiliated or emotionally abused in other ways by your partner or ex-partner?: No   Housing Stability: Not on file           Medications  Allergies   Current Outpatient Medications   Medication Sig Dispense Refill    Ascorbic Acid 500 MG CHEW Take 1 tablet by mouth every other day      aspirin (ASA) 81 MG chewable tablet Take 1 tablet (81 mg) by mouth daily      atorvastatin (LIPITOR) 40 MG tablet TAKE 1 TABLET (40 MG) BY MOUTH AT BEDTIME. 90 tablet 0    coenzyme Q10 (CO Q-10) 200 mg capsule [COENZYME Q10 (CO Q-10) 200 MG CAPSULE] Take 200 mg by mouth daily.      magnesium oxide 400 MG CAPS Take 400 mg by mouth daily.      multivitamin (ONE-DAILY) tablet Take 1 tablet by mouth every other day      nitroGLYcerin (NITROSTAT) 0.4 MG sublingual tablet Place 1 tablet (0.4 mg) under the tongue every 5 minutes as needed 50 tablet 11    Omega-3 Fatty Acids (FISH OIL OMEGA-3) 1000 MG CAPS Take 1 capsule by mouth daily      vitamin D3 (CHOLECALCIFEROL) 125 MCG (5000 UT) tablet Take 1 tablet (125 mcg) by mouth every other day      Lactobacillus rhamnosus GG (CULTURELLE) 10-15 Billion cell capsule [LACTOBACILLUS RHAMNOSUS GG (CULTURELLE) 10-15 BILLION CELL CAPSULE] Take 1 capsule by mouth daily. (Patient not taking: Reported on 5/7/2025)      psyllium husk (METAMUCIL ORAL) [PSYLLIUM HUSK (METAMUCIL ORAL)] Take by mouth. (Patient not taking: Reported on 5/7/2025)      triamcinolone (KENALOG) 0.1 % external ointment Apply topically 2 times daily (Patient not taking: Reported on  "5/7/2025) 80 g 0     No Known Allergies       Lab Results    Chemistry/lipid CBC Cardiac Enzymes/BNP/TSH/INR   Recent Labs   Lab Test 05/20/22  1246   CHOL 127   HDL 52   LDL 64   TRIG 54     Recent Labs   Lab Test 05/20/22  1246 02/24/21  1128 02/20/20  1331   LDL 64 49 54     Recent Labs   Lab Test 05/20/22  1246      POTASSIUM 4.7   CHLORIDE 105   CO2 29   GLC 96   BUN 17   CR 0.94   GFRESTIMATED >90   ELAYNE 9.6     Recent Labs   Lab Test 05/20/22  1246 02/24/21  1128 02/20/20  1331   CR 0.94 1.00 1.00     Recent Labs   Lab Test 05/20/22  1246 02/24/21  1128   A1C 6.1* 5.9*          Recent Labs   Lab Test 05/20/22  1246   WBC 4.8   HGB 14.1   HCT 42.1   MCV 88        Recent Labs   Lab Test 05/20/22  1246 02/24/21  1128 02/20/20  1331   HGB 14.1 14.5 14.3    No results for input(s): \"TROPONINI\" in the last 59321 hours.  No results for input(s): \"BNP\", \"NTBNPI\", \"NTBNP\" in the last 18637 hours.  Recent Labs   Lab Test 02/20/20  1331   TSH 1.18     No results for input(s): \"INR\" in the last 53324 hours.     Hermilo Green, DO    35 minutes spent by me on the date of the encounter doing chart review, history and exam, documentation and further activities per the note                    "

## 2025-05-07 NOTE — LETTER
5/7/2025    Matt Walker MD  [address] [facility] [address]    RE: Viral Martinez       Dear Colleague,     I had the pleasure of seeing Viral Martinez in the [facility].    HEART CARE ENCOUNTER CONSULTATON NOTE      [facility]  [phone]      Assessment/Recommendations   Assessment:   Coronary artery disease status post coronary bypass surgery  2.   Hyperlipidemia, goal LDL less than 70, LDL 67.  3.  High endurance athlete  4.  Palpitations    Plan:   Patient has noticed some palpitations after extensive exercise which could be secondary to Actilite depletion.  He has no palpitations during exercise.  Denies any lightheadedness or syncope.  These were worse during his COVID infection  2.  Continue aspirin 81 mg daily for coronary disease  3.  Continue atorvastatin 40 mg daily for hyperlipidemia, coronary disease  4.  Continue active lifestyle.   5.  Increase hydration with electrolyte solutions when doing endurance training  6.  If worsening or persistent palpitations would consider Zio patch monitor    Patient wife, Ruba was present with patient today.     History of Present Illness/Subjective    HPI: Viral Martinez is a 64 year old male history of coronary artery bypass surgery, multivessel coronary artery disease who presents to cardiology clinic in follow-up.      Currently the patient denies any active anginal chest pain.  He remains a very avid endurance athlete.  Currently he is training for a 60 mile bike ride.      He does occasionally get a sensation of palpitations after prolonged exercise.  This could be related to electrolyte depletion.  He notices that after biking for 60 miles he started feeling some mild irregular beats sometime after he stopped exercising.  He denies any lightheadedness or syncope.  No active chest pressure.  Chronic chest discomfort when doing rapid exertional events which he says if he rubs his chest that resolves.  He has  noticed no change in his ability to exert himself.      In the past he had dyspnea when he was first diagnosed with coronary disease.     Stress test performed last year was very low risk due to the patient ability to exercise at a very high level with no EKG changes or chest pain symptoms.    Lipids are controlled         Medical History  Surgical History Family History Social History   Past Medical History:   Diagnosis Date     Coronary artery disease 2014     Family history of myocardial infarction 2006    brother age 62     GERD (gastroesophageal reflux disease)      High cholesterol 2014    dislipidemia     Past Surgical History:   Procedure Laterality Date     BYPASS GRAFT ARTERY CORONARY  2014    LAD diag OM, right posterior     CARDIAC CATHETERIZATION       ZZC CABG, VEIN, SINGLE      Description: CABG (CABG);  Recorded: 05/12/2014;     Family History   Problem Relation Age of Onset     No Known Problems Mother         89     No Known Problems Father         97     Cervical Cancer Sister      No Known Problems Sister      No Known Problems Sister      No Known Problems Sister      No Known Problems Sister      No Known Problems Sister      No Known Problems Sister      Coronary Artery Disease Brother      Coronary Artery Disease Brother      Coronary Artery Disease Brother      Prostate Cancer Brother      No Known Problems Brother      No Known Problems Daughter      No Known Problems Son         Social History     Socioeconomic History     Marital status: Single     Spouse name: Not on file     Number of children: Not on file     Years of education: Not on file     Highest education level: Not on file   Occupational History     Not on file   Tobacco Use     Smoking status: Never     Smokeless tobacco: Never   Substance and Sexual Activity     Alcohol use: Not Currently     Comment: Alcoholic Drinks/day: 1988 stopped drinking     Drug use: Never     Sexual activity: Yes     Partners: Female   Other Topics  Concern     Not on file   Social History Narrative    Lives with his partner, Ruba Cox (cardiac nurse).  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  Son Lambert and daughter Keli.     Social Drivers of Health     Financial Resource Strain: Not on file   Food Insecurity: Not on file   Transportation Needs: Not on file   Physical Activity: Not on file   Stress: Not on file   Social Connections: Not on file   Interpersonal Safety: Low Risk  (2/16/2024)    Interpersonal Safety      Do you feel physically and emotionally safe where you currently live?: Yes      Within the past 12 months, have you been hit, slapped, kicked or otherwise physically hurt by someone?: No      Within the past 12 months, have you been humiliated or emotionally abused in other ways by your partner or ex-partner?: No   Housing Stability: Not on file           Medications  Allergies   Current Outpatient Medications   Medication Sig Dispense Refill     Ascorbic Acid 500 MG CHEW Take 1 tablet by mouth every other day       aspirin (ASA) 81 MG chewable tablet Take 1 tablet (81 mg) by mouth daily       atorvastatin (LIPITOR) 40 MG tablet TAKE 1 TABLET (40 MG) BY MOUTH AT BEDTIME. 90 tablet 0     coenzyme Q10 (CO Q-10) 200 mg capsule [COENZYME Q10 (CO Q-10) 200 MG CAPSULE] Take 200 mg by mouth daily.       magnesium oxide 400 MG CAPS Take 400 mg by mouth daily.       multivitamin (ONE-DAILY) tablet Take 1 tablet by mouth every other day       nitroGLYcerin (NITROSTAT) 0.4 MG sublingual tablet Place 1 tablet (0.4 mg) under the tongue every 5 minutes as needed 50 tablet 11     Omega-3 Fatty Acids (FISH OIL OMEGA-3) 1000 MG CAPS Take 1 capsule by mouth daily       vitamin D3 (CHOLECALCIFEROL) 125 MCG (5000 UT) tablet Take 1 tablet (125 mcg) by mouth every other day       Lactobacillus rhamnosus GG (CULTURELLE) 10-15 Billion cell capsule [LACTOBACILLUS RHAMNOSUS GG (CULTURELLE) 10-15 BILLION CELL CAPSULE] Take 1 capsule by mouth daily. (Patient not taking: Reported on 5/7/2025)    "    psyllium husk (METAMUCIL ORAL) [PSYLLIUM HUSK (METAMUCIL ORAL)] Take by mouth. (Patient not taking: Reported on 5/7/2025)       triamcinolone (KENALOG) 0.1 % external ointment Apply topically 2 times daily (Patient not taking: Reported on 5/7/2025) 80 g 0     No Known Allergies       Lab Results    Chemistry/lipid CBC Cardiac Enzymes/BNP/TSH/INR   Recent Labs   Lab Test 05/20/22  1246   CHOL 127   HDL 52   LDL 64   TRIG 54     Recent Labs   Lab Test 05/20/22  1246 02/24/21  1128 02/20/20  1331   LDL 64 49 54     Recent Labs   Lab Test 05/20/22  1246      POTASSIUM 4.7   CHLORIDE 105   CO2 29   GLC 96   BUN 17   CR 0.94   GFRESTIMATED >90   ELAYNE 9.6     Recent Labs   Lab Test 05/20/22  1246 02/24/21  1128 02/20/20  1331   CR 0.94 1.00 1.00     Recent Labs   Lab Test 05/20/22  1246 02/24/21  1128   A1C 6.1* 5.9*          Recent Labs   Lab Test 05/20/22  1246   WBC 4.8   HGB 14.1   HCT 42.1   MCV 88        Recent Labs   Lab Test 05/20/22  1246 02/24/21  1128 02/20/20  1331   HGB 14.1 14.5 14.3    No results for input(s): \"TROPONINI\" in the last 62558 hours.  No results for input(s): \"BNP\", \"NTBNPI\", \"NTBNP\" in the last 58634 hours.  Recent Labs   Lab Test 02/20/20  1331   TSH 1.18     No results for input(s): \"INR\" in the last 20328 hours.     Hermilo Green DO    35 minutes spent by me on the date of the encounter doing chart review, history and exam, documentation and further activities per the note                      Thank you for allowing me to participate in the care of your patient.      Sincerely,     Hermilo Green DO     [facility]  cc:   Hermilo Green DO  [address] [place] [address]      "

## 2025-06-27 PROBLEM — D12.6 ADENOMATOUS POLYP OF COLON: Status: ACTIVE | Noted: 2025-06-27

## 2025-06-30 ENCOUNTER — PATIENT OUTREACH (OUTPATIENT)
Dept: GASTROENTEROLOGY | Facility: CLINIC | Age: 65
End: 2025-06-30
Payer: MEDICARE

## 2025-08-02 DIAGNOSIS — I25.10 CORONARY ARTERY DISEASE INVOLVING NATIVE CORONARY ARTERY OF NATIVE HEART WITHOUT ANGINA PECTORIS: ICD-10-CM

## 2025-08-05 RX ORDER — ATORVASTATIN CALCIUM 40 MG/1
40 TABLET, FILM COATED ORAL AT BEDTIME
Qty: 90 TABLET | Refills: 2 | Status: SHIPPED | OUTPATIENT
Start: 2025-08-05